# Patient Record
Sex: FEMALE | Race: WHITE | Employment: FULL TIME | URBAN - METROPOLITAN AREA
[De-identification: names, ages, dates, MRNs, and addresses within clinical notes are randomized per-mention and may not be internally consistent; named-entity substitution may affect disease eponyms.]

---

## 2017-04-28 DIAGNOSIS — I10 ESSENTIAL HYPERTENSION: ICD-10-CM

## 2017-04-28 RX ORDER — LISINOPRIL 10 MG/1
TABLET ORAL
Qty: 90 TABLET | Refills: 2 | Status: SHIPPED | OUTPATIENT
Start: 2017-04-28 | End: 2018-01-23 | Stop reason: SDUPTHER

## 2017-12-31 ENCOUNTER — HOSPITAL ENCOUNTER (EMERGENCY)
Age: 47
Discharge: HOME | End: 2017-12-31
Attending: EMERGENCY MEDICINE
Payer: COMMERCIAL

## 2017-12-31 VITALS
BODY MASS INDEX: 27.09 KG/M2 | SYSTOLIC BLOOD PRESSURE: 134 MMHG | HEART RATE: 87 BPM | RESPIRATION RATE: 15 BRPM | DIASTOLIC BLOOD PRESSURE: 79 MMHG | TEMPERATURE: 98.2 F | HEIGHT: 72 IN | OXYGEN SATURATION: 90 % | WEIGHT: 200 LBS

## 2017-12-31 DIAGNOSIS — I80.01 THROMBOPHLEBITIS OF SUPERFICIAL VEINS OF RIGHT LOWER EXTREMITY: Primary | ICD-10-CM

## 2017-12-31 LAB
ABSOLUTE EOS #: 0.1 K/UL (ref 0–0.4)
ABSOLUTE IMMATURE GRANULOCYTE: ABNORMAL K/UL (ref 0–0.3)
ABSOLUTE LYMPH #: 1.7 K/UL (ref 1–4.8)
ABSOLUTE MONO #: 0.5 K/UL (ref 0.1–1.3)
ANION GAP SERPL CALCULATED.3IONS-SCNC: 11 MMOL/L (ref 9–17)
BASOPHILS # BLD: 1 % (ref 0–2)
BASOPHILS ABSOLUTE: 0.1 K/UL (ref 0–0.2)
BUN BLDV-MCNC: 14 MG/DL (ref 6–20)
BUN/CREAT BLD: ABNORMAL (ref 9–20)
CALCIUM SERPL-MCNC: 9 MG/DL (ref 8.6–10.4)
CHLORIDE BLD-SCNC: 102 MMOL/L (ref 98–107)
CO2: 24 MMOL/L (ref 20–31)
CREAT SERPL-MCNC: 0.63 MG/DL (ref 0.5–0.9)
D-DIMER QUANTITATIVE: 0.21 MG/L FEU
DIFFERENTIAL TYPE: ABNORMAL
EOSINOPHILS RELATIVE PERCENT: 2 % (ref 0–4)
GFR AFRICAN AMERICAN: >60 ML/MIN
GFR NON-AFRICAN AMERICAN: >60 ML/MIN
GFR SERPL CREATININE-BSD FRML MDRD: ABNORMAL ML/MIN/{1.73_M2}
GFR SERPL CREATININE-BSD FRML MDRD: ABNORMAL ML/MIN/{1.73_M2}
GLUCOSE BLD-MCNC: 101 MG/DL (ref 70–99)
HCT VFR BLD CALC: 46.4 % (ref 36–46)
HEMOGLOBIN: 15.2 G/DL (ref 12–16)
IMMATURE GRANULOCYTES: ABNORMAL %
INR BLD: 1
LYMPHOCYTES # BLD: 21 % (ref 24–44)
MCH RBC QN AUTO: 28.4 PG (ref 26–34)
MCHC RBC AUTO-ENTMCNC: 32.8 G/DL (ref 31–37)
MCV RBC AUTO: 86.5 FL (ref 80–100)
MONOCYTES # BLD: 6 % (ref 1–7)
PDW BLD-RTO: 13.6 % (ref 11.5–14.9)
PLATELET # BLD: 278 K/UL (ref 150–450)
PLATELET ESTIMATE: ABNORMAL
PMV BLD AUTO: 9.7 FL (ref 6–12)
POTASSIUM SERPL-SCNC: 4.5 MMOL/L (ref 3.7–5.3)
PROTHROMBIN TIME: 10.4 SEC (ref 9.7–12)
RBC # BLD: 5.36 M/UL (ref 4–5.2)
RBC # BLD: ABNORMAL 10*6/UL
SEG NEUTROPHILS: 70 % (ref 36–66)
SEGMENTED NEUTROPHILS ABSOLUTE COUNT: 5.7 K/UL (ref 1.3–9.1)
SODIUM BLD-SCNC: 137 MMOL/L (ref 135–144)
WBC # BLD: 8 K/UL (ref 3.5–11)
WBC # BLD: ABNORMAL 10*3/UL

## 2017-12-31 PROCEDURE — 85025 COMPLETE CBC W/AUTO DIFF WBC: CPT

## 2017-12-31 PROCEDURE — 6370000000 HC RX 637 (ALT 250 FOR IP): Performed by: EMERGENCY MEDICINE

## 2017-12-31 PROCEDURE — 36415 COLL VENOUS BLD VENIPUNCTURE: CPT

## 2017-12-31 PROCEDURE — 85610 PROTHROMBIN TIME: CPT

## 2017-12-31 PROCEDURE — 99283 EMERGENCY DEPT VISIT LOW MDM: CPT

## 2017-12-31 PROCEDURE — 85379 FIBRIN DEGRADATION QUANT: CPT

## 2017-12-31 PROCEDURE — 80048 BASIC METABOLIC PNL TOTAL CA: CPT

## 2017-12-31 RX ORDER — ACETAMINOPHEN 325 MG/1
650 TABLET ORAL ONCE
Status: COMPLETED | OUTPATIENT
Start: 2017-12-31 | End: 2017-12-31

## 2017-12-31 RX ORDER — IBUPROFEN 600 MG/1
600 TABLET ORAL EVERY 8 HOURS PRN
Qty: 30 TABLET | Refills: 0 | Status: SHIPPED | OUTPATIENT
Start: 2017-12-31

## 2017-12-31 RX ADMIN — ACETAMINOPHEN 650 MG: 325 TABLET, FILM COATED ORAL at 14:52

## 2017-12-31 ASSESSMENT — PAIN DESCRIPTION - PAIN TYPE: TYPE: ACUTE PAIN

## 2017-12-31 ASSESSMENT — PAIN SCALES - GENERAL
PAINLEVEL_OUTOF10: 5
PAINLEVEL_OUTOF10: 0

## 2017-12-31 ASSESSMENT — PAIN DESCRIPTION - LOCATION: LOCATION: ARM

## 2017-12-31 NOTE — ED PROVIDER NOTES
[address]  eMERGENCY dEPARTMENT eNCOUnter      Pt Name: Atul Wagoner  MRN: [id number]  [place] 1970  Date of evaluation: 12/31/17      CHIEF COMPLAINT       Chief Complaint   Patient presents with    Arm Pain     left arm     HISTORY OF PRESENT ILLNESS   HPI 41 y.o. female, r. Handed, noted pain in her right tricep the day after guru. Pain spread down the arm. She noted that she had some bruising along the track of her brachial vein. She reports a constant throbbing pain site. Now she has pain in her forearm and bruising in her palm. She has been taking advil 600mg once per day. She is on birth control for regular periods and history of abnormal uterine bleeding. No history DVT PE area and no shortness of breath. Review of Systems   Constitutional: Negative for chills and fever. HENT: Negative for congestion. Eyes: Negative for visual disturbance. Respiratory: Negative for cough and shortness of breath. Cardiovascular: Negative for chest pain. Gastrointestinal: Negative for abdominal pain, nausea and vomiting. Endocrine: Negative for polyuria. Genitourinary: Negative for dysuria. Neurological: Negative for weakness and numbness. Hematological: Negative for adenopathy. Does not bruise/bleed easily.        PAST MEDICAL HISTORY     Past Medical History:   Diagnosis Date    DUB (dysfunctional uterine bleeding)     Hypertension     LBBB (left bundle branch block)     Ovarian cyst        SURGICAL HISTORY       Past Surgical History:   Procedure Laterality Date    ENDOMETRIAL ABLATION         CURRENT MEDICATIONS       Previous Medications    ALBUTEROL SULFATE  (90 BASE) MCG/ACT INHALER    Inhale 2 puffs into the lungs every 6 hours as needed for Wheezing    ALPRAZOLAM (XANAX) 0.5 MG TABLET    Take 1 tablet by mouth nightly as needed for Sleep    AMOXICILLIN (AMOXIL) 500 MG CAPSULE        BROMPHENIRAMINE-PSEUDOEPHEDRINE-DM 30-2-10 MG/5ML SYRUP    Take 5 mLs by mouth 3 times daily    EPINEPHRINE (EPIPEN) 0.3 MG/0.3ML SOAJ INJECTION    Inject 0.3 mg into the muscle as needed Use as directed for allergic reaction    FLUTICASONE (FLONASE) 50 MCG/ACT NASAL SPRAY    1 spray by Nasal route 2 times daily for 14 days    LISINOPRIL (PRINIVIL;ZESTRIL) 10 MG TABLET    TAKE 1 TABLET DAILY    LO LOESTRIN FE 1 MG-10 MCG / 10 MCG TABLET    Take 1 tablet by mouth daily       ALLERGIES     is allergic to bactrim [sulfamethoxazole-trimethoprim] and dilaudid [hydromorphone hcl]. FAMILY HISTORY     indicated that her mother is alive. She indicated that her father is alive. She indicated that the status of her maternal grandmother is unknown. SOCIAL HISTORY      reports that she has never smoked. She does not have any smokeless tobacco history on file. She reports that she does not drink alcohol or use drugs. PHYSICAL EXAM     INITIAL VITALS: /79   Pulse 87   Temp 98.2 °F (36.8 °C) (Oral)   Resp 15   Ht 6' 1\" (1.854 m)   Wt 200 lb (90.7 kg)   SpO2 90%   BMI 26.39 kg/m²   Gen:NAD  Head: Normocephalic, atraumatic  Eye: Pupils equal round reactive to light, no conjunctivitis  Heart: Regular rate and rhythm no murmurs  Lungs: Clear to auscultation bilaterally, no respiratory distress  Chest wall: No crepitus, no tenderness palpation  Abdomen: Soft, nontender, nondistended, with no peritoneal signs  Neurologic: Patient is alert and oriented x3, motor and sensation is intact in all 4 extremities, cerebellar function is normal  Extremities: Full range of motion, no cyanosis, there is ecchymosis and tenderness palpation along the course of the right basilic vein. There is no arm edema. MEDICAL DECISION MAKING:     MDM  Differential diagnosis is and the DVT versus a superficial thrombophlebitis versus coagulopathy. We'll check the patient's coags will get a d-dimer if the d-dimer is elevated we'll get an ultrasound of the arm.     ED Course    On reassessment, laboratory studies are unremarkable is no leukocytosis d-dimer is not elevated coags are normal.  Given the tenderness in along the course of the vein in the skin color changes I suspect that the patient has a thrombophlebitis. Discussed anti-inflammatory medications, compression, and elevation and close follow-up with PCP today sure that these changes resolved. Discussed warning precautions for return to the emergency department and stress to come back if things are not improving. LABS: All lab results were reviewed by myself, and all abnormals are listed below. Labs Reviewed   CBC WITH AUTO DIFFERENTIAL - Abnormal; Notable for the following:        Result Value    RBC 5.36 (*)     Hematocrit 46.4 (*)     Seg Neutrophils 70 (*)     Lymphocytes 21 (*)     All other components within normal limits   BASIC METABOLIC PANEL - Abnormal; Notable for the following:     Glucose 101 (*)     All other components within normal limits   D-DIMER, QUANTITATIVE   PROTIME-INR       EMERGENCY DEPARTMENT COURSE:   Vitals:    Vitals:    12/31/17 1223   BP: 134/79   Pulse: 87   Resp: 15   Temp: 98.2 °F (36.8 °C)   TempSrc: Oral   SpO2: 90%   Weight: 200 lb (90.7 kg)   Height: 6' 1\" (1.854 m)       The patient was given the following medications while in the emergency department:  Orders Placed This Encounter   Medications    acetaminophen (TYLENOL) tablet 650 mg    ibuprofen (ADVIL;MOTRIN) 600 MG tablet     Sig: Take 1 tablet by mouth every 8 hours as needed for Pain     Dispense:  30 tablet     Refill:  0   -------------------------    Procedures     FINAL IMPRESSION      1.  Thrombophlebitis of superficial veins of right lower extremity          DISPOSITION/PLAN   DISPOSITION Decision To Discharge 12/31/2017 03:17:03 PM      PATIENT REFERRED TO:  Carla Rubio NP  [address]  [address]  [address] [facility] [address] [phone]    In 1 week  To monitor until skin changes resolve    [address] [address]  [phone]    If symptoms worsen      DISCHARGE MEDICATIONS:  New Prescriptions    IBUPROFEN (ADVIL;MOTRIN) 600 MG TABLET    Take 1 tablet by mouth every 8 hours as needed for Pain         Paola Zhu MD  Attending Emergency Physician                      Paola Zhu MD  12/31/17 9701

## 2017-12-31 NOTE — ED NOTES
Pt c/o right arm pain that started in her upper right arm and migrated into her right forearm , pt noticed a streak of bruising from the upper right arm into right forearm last night .  Pt c/o pain with movement      Panchito Birmingham RN  12/31/17 [id number]

## 2020-02-28 ENCOUNTER — HOSPITAL ENCOUNTER (OUTPATIENT)
Age: 50
Discharge: HOME | End: 2020-02-28
Payer: COMMERCIAL

## 2020-02-28 LAB
ABSOLUTE EOS #: 0.2 K/UL (ref 0–0.4)
ABSOLUTE IMMATURE GRANULOCYTE: ABNORMAL K/UL (ref 0–0.3)
ABSOLUTE LYMPH #: 1.5 K/UL (ref 1–4.8)
ABSOLUTE MONO #: 0.5 K/UL (ref 0.1–1.3)
ALBUMIN SERPL-MCNC: 4.3 G/DL (ref 3.5–5.2)
ALBUMIN/GLOBULIN RATIO: NORMAL (ref 1–2.5)
ALP BLD-CCNC: 60 U/L (ref 35–104)
ALT SERPL-CCNC: 11 U/L (ref 5–33)
ANION GAP SERPL CALCULATED.3IONS-SCNC: 10 MMOL/L (ref 9–17)
AST SERPL-CCNC: 11 U/L
BASOPHILS # BLD: 1 % (ref 0–2)
BASOPHILS ABSOLUTE: 0.1 K/UL (ref 0–0.2)
BILIRUB SERPL-MCNC: 0.36 MG/DL (ref 0.3–1.2)
BUN BLDV-MCNC: 15 MG/DL (ref 6–20)
BUN/CREAT BLD: NORMAL (ref 9–20)
CALCIUM SERPL-MCNC: 8.9 MG/DL (ref 8.6–10.4)
CHLORIDE BLD-SCNC: 107 MMOL/L (ref 98–107)
CHOLESTEROL, FASTING: 192 MG/DL
CHOLESTEROL/HDL RATIO: 4.7
CO2: 23 MMOL/L (ref 20–31)
CREAT SERPL-MCNC: 0.68 MG/DL (ref 0.5–0.9)
DIFFERENTIAL TYPE: ABNORMAL
EOSINOPHILS RELATIVE PERCENT: 2 % (ref 0–4)
ESTIMATED AVERAGE GLUCOSE: 108 MG/DL
GFR AFRICAN AMERICAN: >60 ML/MIN
GFR NON-AFRICAN AMERICAN: >60 ML/MIN
GFR SERPL CREATININE-BSD FRML MDRD: NORMAL ML/MIN/{1.73_M2}
GFR SERPL CREATININE-BSD FRML MDRD: NORMAL ML/MIN/{1.73_M2}
GLUCOSE BLD-MCNC: 98 MG/DL (ref 70–99)
HBA1C MFR BLD: 5.4 % (ref 4–6)
HCT VFR BLD CALC: 45 % (ref 36–46)
HDLC SERPL-MCNC: 41 MG/DL
HEMOGLOBIN: 14.8 G/DL (ref 12–16)
IMMATURE GRANULOCYTES: ABNORMAL %
LDL CHOLESTEROL: 123 MG/DL (ref 0–130)
LYMPHOCYTES # BLD: 21 % (ref 24–44)
MCH RBC QN AUTO: 28.6 PG (ref 26–34)
MCHC RBC AUTO-ENTMCNC: 32.9 G/DL (ref 31–37)
MCV RBC AUTO: 87.2 FL (ref 80–100)
MONOCYTES # BLD: 8 % (ref 1–7)
NRBC AUTOMATED: ABNORMAL PER 100 WBC
PDW BLD-RTO: 13.4 % (ref 11.5–14.9)
PLATELET # BLD: 240 K/UL (ref 150–450)
PLATELET ESTIMATE: ABNORMAL
PMV BLD AUTO: 9.3 FL (ref 6–12)
POTASSIUM SERPL-SCNC: 4.3 MMOL/L (ref 3.7–5.3)
RBC # BLD: 5.16 M/UL (ref 4–5.2)
RBC # BLD: ABNORMAL 10*6/UL
SEG NEUTROPHILS: 68 % (ref 36–66)
SEGMENTED NEUTROPHILS ABSOLUTE COUNT: 5 K/UL (ref 1.3–9.1)
SODIUM BLD-SCNC: 140 MMOL/L (ref 135–144)
TOTAL PROTEIN: 7.3 G/DL (ref 6.4–8.3)
TRIGLYCERIDE, FASTING: 140 MG/DL
VLDLC SERPL CALC-MCNC: NORMAL MG/DL (ref 1–30)
WBC # BLD: 7.3 K/UL (ref 3.5–11)
WBC # BLD: ABNORMAL 10*3/UL

## 2020-02-28 PROCEDURE — 85025 COMPLETE CBC W/AUTO DIFF WBC: CPT

## 2020-02-28 PROCEDURE — 80061 LIPID PANEL: CPT

## 2020-02-28 PROCEDURE — 36415 COLL VENOUS BLD VENIPUNCTURE: CPT

## 2020-02-28 PROCEDURE — 83036 HEMOGLOBIN GLYCOSYLATED A1C: CPT

## 2020-02-28 PROCEDURE — 80053 COMPREHEN METABOLIC PANEL: CPT

## 2021-09-30 ENCOUNTER — NURSE TRIAGE (OUTPATIENT)
Dept: OTHER | Facility: CLINIC | Age: 51
End: 2021-09-30

## 2021-09-30 NOTE — TELEPHONE ENCOUNTER
Received call from [address]  at [facility] with Red Flag Complaint. Brief description of triage:    55 y/o with bp issue for the past  Several days   Reports vertigo and balance issues      Reports feeling better with standing up   160/90 was one of the bp readings       Pt reports inconsistent bp readings with home cuff  Also reports taking a second dose of  bp medication   Educated pt on  Taking medication as prescribed only    Triage indicates for patient to  Seen in the next  3 days     Care advice provided, patient verbalizes understanding; denies any other questions or concerns; instructed to call back for any new or worsening symptoms. Writer provided warm transfer to [employer]  at [facility] for appointment scheduling. Attention Provider: Thank you for allowing me to participate in the care of your patient. The patient was connected to triage in response to information provided to the ECC/PSC. Please do not respond through this encounter as the response is not directed to a shared pool. Reason for Disposition   Systolic BP >= 046 OR Diastolic >= 617    Answer Assessment - Initial Assessment Questions  1. BLOOD PRESSURE: \"What is the blood pressure? \" \"Did you take at least two measurements 5 minutes apart?\"      160/92 130/80    2. ONSET: \"When did you take your blood pressure? \"      Today    3. HOW: \"How did you obtain the blood pressure? \" (e.g., visiting nurse, automatic home BP monitor)      Home monitor    4. HISTORY: \"Do you have a history of high blood pressure? \"     Yes     5. MEDICATIONS: Alphia Shan you taking any medications for blood pressure? \" \"Have you missed any doses recently? \"      No     6. OTHER SYMPTOMS: \"Do you have any symptoms? \" (e.g., headache, chest pain, blurred vision, difficulty breathing, weakness)      Dizziness , occassional headache     7. PREGNANCY: \"Is there any chance you are pregnant? \" \"When was your last menstrual period? \"      n/a    Protocols used: HIGH BLOOD PRESSURE-ADULT-OH

## 2021-11-02 ENCOUNTER — HOSPITAL ENCOUNTER (OUTPATIENT)
Age: 51
Setting detail: SPECIMEN
Discharge: HOME | End: 2021-11-02
Payer: COMMERCIAL

## 2021-11-02 DIAGNOSIS — E55.9 VITAMIN D INSUFFICIENCY: ICD-10-CM

## 2021-11-02 DIAGNOSIS — I10 ESSENTIAL HYPERTENSION: ICD-10-CM

## 2021-11-02 DIAGNOSIS — R42 DIZZINESS: ICD-10-CM

## 2021-11-02 LAB
ABSOLUTE EOS #: 0.23 K/UL (ref 0–0.44)
ABSOLUTE IMMATURE GRANULOCYTE: <0.03 K/UL (ref 0–0.3)
ABSOLUTE LYMPH #: 2.03 K/UL (ref 1.1–3.7)
ABSOLUTE MONO #: 0.49 K/UL (ref 0.1–1.2)
ALBUMIN SERPL-MCNC: 4.3 G/DL (ref 3.5–5.2)
ALBUMIN/GLOBULIN RATIO: 1.6 (ref 1–2.5)
ALP BLD-CCNC: 79 U/L (ref 35–104)
ALT SERPL-CCNC: 17 U/L (ref 5–33)
ANION GAP SERPL CALCULATED.3IONS-SCNC: 12 MMOL/L (ref 9–17)
AST SERPL-CCNC: 14 U/L
BASOPHILS # BLD: 1 % (ref 0–2)
BASOPHILS ABSOLUTE: 0.08 K/UL (ref 0–0.2)
BILIRUB SERPL-MCNC: 0.37 MG/DL (ref 0.3–1.2)
BUN BLDV-MCNC: 15 MG/DL (ref 6–20)
BUN/CREAT BLD: NORMAL (ref 9–20)
CALCIUM SERPL-MCNC: 9.1 MG/DL (ref 8.6–10.4)
CHLORIDE BLD-SCNC: 106 MMOL/L (ref 98–107)
CHOLESTEROL, FASTING: 190 MG/DL
CHOLESTEROL/HDL RATIO: 4.4
CO2: 21 MMOL/L (ref 20–31)
CREAT SERPL-MCNC: 0.74 MG/DL (ref 0.5–0.9)
DIFFERENTIAL TYPE: ABNORMAL
EOSINOPHILS RELATIVE PERCENT: 3 % (ref 1–4)
FOLATE: 4.2 NG/ML
GFR AFRICAN AMERICAN: >60 ML/MIN
GFR NON-AFRICAN AMERICAN: >60 ML/MIN
GFR SERPL CREATININE-BSD FRML MDRD: NORMAL ML/MIN/{1.73_M2}
GFR SERPL CREATININE-BSD FRML MDRD: NORMAL ML/MIN/{1.73_M2}
GLUCOSE BLD-MCNC: 98 MG/DL (ref 70–99)
HCT VFR BLD CALC: 49 % (ref 36.3–47.1)
HDLC SERPL-MCNC: 43 MG/DL
HEMOGLOBIN: 14.8 G/DL (ref 11.9–15.1)
IMMATURE GRANULOCYTES: 0 %
LDL CHOLESTEROL: 122 MG/DL (ref 0–130)
LYMPHOCYTES # BLD: 28 % (ref 24–43)
MCH RBC QN AUTO: 27.7 PG (ref 25.2–33.5)
MCHC RBC AUTO-ENTMCNC: 30.2 G/DL (ref 28.4–34.8)
MCV RBC AUTO: 91.8 FL (ref 82.6–102.9)
MONOCYTES # BLD: 7 % (ref 3–12)
NRBC AUTOMATED: 0 PER 100 WBC
PDW BLD-RTO: 12.9 % (ref 11.8–14.4)
PLATELET # BLD: 266 K/UL (ref 138–453)
PLATELET ESTIMATE: ABNORMAL
PMV BLD AUTO: 11.6 FL (ref 8.1–13.5)
POTASSIUM SERPL-SCNC: 4.6 MMOL/L (ref 3.7–5.3)
RBC # BLD: 5.34 M/UL (ref 3.95–5.11)
RBC # BLD: ABNORMAL 10*6/UL
SEG NEUTROPHILS: 61 % (ref 36–65)
SEGMENTED NEUTROPHILS ABSOLUTE COUNT: 4.52 K/UL (ref 1.5–8.1)
SODIUM BLD-SCNC: 139 MMOL/L (ref 135–144)
THYROXINE, FREE: 1.25 NG/DL (ref 0.93–1.7)
TOTAL PROTEIN: 7 G/DL (ref 6.4–8.3)
TRIGLYCERIDE, FASTING: 125 MG/DL
TSH SERPL DL<=0.05 MIU/L-ACNC: 3.12 MIU/L (ref 0.3–5)
VITAMIN B-12: 287 PG/ML (ref 232–1245)
VITAMIN D 25-HYDROXY: 28.6 NG/ML (ref 30–100)
VLDLC SERPL CALC-MCNC: NORMAL MG/DL (ref 1–30)
WBC # BLD: 7.4 K/UL (ref 3.5–11.3)
WBC # BLD: ABNORMAL 10*3/UL

## 2021-11-12 ENCOUNTER — INITIAL CONSULT (OUTPATIENT)
Dept: BARIATRICS/WEIGHT MGMT | Age: 51
End: 2021-11-12
Payer: COMMERCIAL

## 2021-11-12 VITALS
BODY MASS INDEX: 33.18 KG/M2 | SYSTOLIC BLOOD PRESSURE: 115 MMHG | WEIGHT: 245 LBS | RESPIRATION RATE: 20 BRPM | DIASTOLIC BLOOD PRESSURE: 75 MMHG | HEART RATE: 81 BPM | HEIGHT: 72 IN

## 2021-11-12 DIAGNOSIS — K44.9 HIATAL HERNIA WITH GERD: Primary | ICD-10-CM

## 2021-11-12 DIAGNOSIS — K21.9 HIATAL HERNIA WITH GERD: Primary | ICD-10-CM

## 2021-11-12 PROCEDURE — G8417 CALC BMI ABV UP PARAM F/U: HCPCS | Performed by: SURGERY

## 2021-11-12 PROCEDURE — G8427 DOCREV CUR MEDS BY ELIG CLIN: HCPCS | Performed by: SURGERY

## 2021-11-12 PROCEDURE — G8484 FLU IMMUNIZE NO ADMIN: HCPCS | Performed by: SURGERY

## 2021-11-12 PROCEDURE — 99203 OFFICE O/P NEW LOW 30 MIN: CPT | Performed by: SURGERY

## 2021-11-12 PROCEDURE — 1036F TOBACCO NON-USER: CPT | Performed by: SURGERY

## 2021-11-19 NOTE — PROGRESS NOTES
[facility]  [facility] INVASIVE BARIATRIC SURG  [address] [place] [address] [place]  [address]  [facility] [address]  Dept: [phone]    ROBOTIC & MINIMALLY INVASIVE SURGERY  PROGRESS NOTE INITIAL EVALUATION     Patient: Jennifer Cobos        Service Date: 11/12/2021      HPI:     Chief Complaint   Patient presents with    Consultation     gen surg consult hiatal hernia/ reflux        The patient is a pleasant 54y.o. year old female  with GERD, who stands Height: 6' (182.9 cm) tall with a weight of Weight: 245 lb (111.1 kg) , resulting in a BMI of Body mass index is 33.23 kg/m². She reports a history of hiatal hernia and has been taking over the counter medications for her GERD. She states her reflux worsens when not on medications and also when laying flat. She denies dysphagia, melena, hematochezia or hematemesis. She denies recent endoscopy. She would like further evaluation and options for her GERD. She does not smoke or drink. The patient denies  a history of myocardial infarction, deep vein thrombosis, pulmonary embolism, renal failure, hepatic failure and stroke.       Medical History:  Past Medical History:   Diagnosis Date    DUB (dysfunctional uterine bleeding)     Hypertension     LBBB (left bundle branch block)     Ovarian cyst        Surgical History:  Past Surgical History:   Procedure Laterality Date    ENDOMETRIAL ABLATION         Family History:      Problem Relation Age of Onset    High Blood Pressure Mother     High Blood Pressure Father     Heart Defect Father         a-fib    Cancer Father         prostate    Cancer Maternal Grandmother         liver cancer       Social History:   Social History     Tobacco Use    Smoking status: Never Smoker    Smokeless tobacco: Never Used   Substance Use Topics    Alcohol use: No     Alcohol/week: 0.0 standard drinks    Drug use: No       Current Med List:  Current Outpatient Medications   Medication Sig Dispense Refill    EPINEPHrine (EPIPEN) 0.3 MG/0.3ML SOAJ injection Use as directed for allergic reaction 2 each 3    lisinopril (PRINIVIL;ZESTRIL) 10 MG tablet TAKE 1 TABLET DAILY 90 tablet 0    ibuprofen (ADVIL;MOTRIN) 600 MG tablet Take 1 tablet by mouth every 8 hours as needed for Pain 30 tablet 0    LO LOESTRIN FE 1 MG-10 MCG / 10 MCG tablet Take 1 tablet by mouth daily (Patient not taking: Reported on 10/7/2021)       No current facility-administered medications for this visit. SOCIAL:      This patient is alone for the evaluation today. [] HIV Risk Factors (i.e.) intravenous drug abuser; at risk sexual behavior; received blood products    [] TB Risk Factors (i.e.) Medically underserved, institutional care, foreign born, endemic area; exposure to active case    [] Hepatitis B&C Risk Factors (i.e.) Received blood transfusion prior to 1992; recreational drug use; high risk sexual behaviors; tattoos or body piercings; contact with blood or needle sticks in the workplace    Comprehension    Ability to grasp concepts and respond to questions:   [x] High   [] Medium   [] Low    Motivation    [x] Asks Questions; eager to learn   [] Needs education   [] Extreme anxiety    [] uncooperative   [] Denies need for education    English Speaking Ability    [x] Speaks English well   [x] Reads English well   [x] Understands spoken english    [x] Understands written English   [] No need for interpretive support      [] Might benefit from interpretive support   []  required for all services     REVIEW OF SYSTEMS: (Negative unless marked otherwise)     See review of Systems scanned into media    PRESENT ILLNESS:     Weight Parameters  Weight 245 lb (111.1 kg)   Height 6' (1.829 m)   BMI Body mass index is 33.23 kg/m².    IBW     EBW               IMMUNIZATION STATUS  Immunization History   Administered Date(s) Administered    COVID-19, Pfizer, PF, 30mcg/0.3mL 02/12/2021, 03/05/2021, 10/16/2021    Influenza Whole 01/16/2014 FALLS ASSESSMENT    [] LOW RISK FOR FALLS    [] MODERATE RISK FOR FALLS    [] Difficulty walking/selfcare    [] Falls in the past 2 months    [] Suspicion of Clinician    [] Other:      SMOKING CESSATION     [] Not needed     [] Instructed to stop smoking    [] Pamphlet community resources given     VTE SCREEN    [] Family hx DVT/PE  /   [] Personal hx of DVT/PE    [x] Denies any family or personal hx of DVT/PE    Physician Review    [x] Past medical, family, & social history reviewed and discussed with patient. Review of surgery and post-surgical changes (by surgeon for surgical patients only)    [x] Lifelong diet expectations reviewed with patient    [x] Need for lifelong vitamin supplementation reviewed with patient    PHYSICAL EXAMINATION:      /75 (Site: Right Upper Arm, Position: Sitting, Cuff Size: Large Adult)   Pulse 81   Resp 20   Ht 6' (1.829 m)   Wt 245 lb (111.1 kg)   BMI 33.23 kg/m²     Constitutional:  Vital signs are normal. The patient appears well-developed   HEENT:      Head: Normocephalic. Atraumatic     Eyes: pupils are equal and reactive. No scleral icterus is present. Neck: No mass and no thyromegaly present. Cardiovascular: Normal rate, regular rhythm, S1 normal and S2 normal.  Bilateral pulses present. Pulmonary/Chest: Effort normal and breath sounds normal. No retractions. Abdominal: Soft. Normal appearance. There is no organomegaly. No tenderness. There is no rigidity, no rebound, no guarding and no Castillo's sign. Musculoskeletal:      Right lower leg: Normal. No tenderness and no edema. Left lower leg: Normal. No tenderness and no edema. Lymphadenopathy:     No cervical adenopathy, No Exrtemity Adenopathy. Neurological: The patient is alert and oriented. Moving all four extremities equally, sensation grossly intact bilateral.  Skin: Skin is warm, dry and intact. Psychiatric: The patient has a normal mood and affect.  Speech is normal and behavior is normal. Judgment and thought content normal. Cognition and memory are normal.       PLAN:       Diagnosis Orders   1.  Hiatal hernia with GERD              PPI for GERD  No meals 3 hours prior to bedtime  Sleep with head of bed elevated  Plan for EGD with long standing reflux  We discussed medical and surgical options for GERD  Will follow up after testing  All questions answered            Electronically signed by Danyelle Mondragon DO on 11/18/2021 at 8:46 PM

## 2021-12-10 ENCOUNTER — TELEPHONE (OUTPATIENT)
Dept: BARIATRICS/WEIGHT MGMT | Age: 51
End: 2021-12-10

## 2021-12-14 ENCOUNTER — ANESTHESIA EVENT (OUTPATIENT)
Dept: OPERATING ROOM | Age: 51
End: 2021-12-14
Payer: COMMERCIAL

## 2021-12-14 ENCOUNTER — OFFICE VISIT (OUTPATIENT)
Dept: PODIATRY | Age: 51
End: 2021-12-14
Payer: COMMERCIAL

## 2021-12-14 ENCOUNTER — HOSPITAL ENCOUNTER (OUTPATIENT)
Age: 51
Setting detail: SPECIMEN
Discharge: HOME | End: 2021-12-14

## 2021-12-14 VITALS — HEIGHT: 72 IN | WEIGHT: 200 LBS | BODY MASS INDEX: 27.09 KG/M2

## 2021-12-14 DIAGNOSIS — M79.672 PAIN IN LEFT FOOT: ICD-10-CM

## 2021-12-14 DIAGNOSIS — M79.671 PAIN IN RIGHT FOOT: ICD-10-CM

## 2021-12-14 DIAGNOSIS — M72.2 PLANTAR FASCIITIS OF LEFT FOOT: ICD-10-CM

## 2021-12-14 DIAGNOSIS — M67.471 GANGLION CYST OF RIGHT FOOT: Primary | ICD-10-CM

## 2021-12-14 DIAGNOSIS — M72.2 PLANTAR FASCIITIS OF RIGHT FOOT: ICD-10-CM

## 2021-12-14 PROCEDURE — 1036F TOBACCO NON-USER: CPT | Performed by: PODIATRIST

## 2021-12-14 PROCEDURE — G8427 DOCREV CUR MEDS BY ELIG CLIN: HCPCS | Performed by: PODIATRIST

## 2021-12-14 PROCEDURE — G8484 FLU IMMUNIZE NO ADMIN: HCPCS | Performed by: PODIATRIST

## 2021-12-14 PROCEDURE — G8417 CALC BMI ABV UP PARAM F/U: HCPCS | Performed by: PODIATRIST

## 2021-12-14 PROCEDURE — 99203 OFFICE O/P NEW LOW 30 MIN: CPT | Performed by: PODIATRIST

## 2021-12-14 PROCEDURE — 20612 ASPIRATE/INJ GANGLION CYST: CPT | Performed by: PODIATRIST

## 2021-12-14 ASSESSMENT — ENCOUNTER SYMPTOMS
DIARRHEA: 0
SHORTNESS OF BREATH: 0
NAUSEA: 0
COLOR CHANGE: 0
BACK PAIN: 0

## 2021-12-15 ENCOUNTER — TELEPHONE (OUTPATIENT)
Dept: PODIATRY | Age: 51
End: 2021-12-15

## 2021-12-15 NOTE — PROGRESS NOTES
Preoperative Instructions:    Stop eating solid foods at midnight the night prior to your surgery. Stop drinking clear liquids at midnight the night prior to your surgery. Arrive at the surgery center (3rd entrance) on __[phone]_____________ by __as per surgeons office [phone]_____________. Please stop any blood thinning medications as directed by your surgeon or prescribing physician. Failure to stop certain medications may interfere with your scheduled surgery. These may include: Aspirin, Coumadin, Plavix, NSAIDS (Motrin, Aleve, Advil, Mobic, Celebrex), Eliquis, Pradaxa, Xarelto, Fish oil, and herbal supplements. You may continue the rest of your medications through the night before surgery unless instructed otherwise. Day of surgery please take only the following medication(s) with a small sip of water:None      Please shower with antibacterial soap and water. .      Reminders:  -If you are going home the day of your procedure, you will need a family member or friend to stay during the procedure and drive you home after your procedure. Your  must be 25years of age or older and able to sign off on your discharge instructions.    -If you are going home the same day of your surgery, someone must remain with you for the first 24 hours after your surgery if you receive sedation or anesthesia.      -Please do not wear any jewelery, contacts or body piercing the day of surgery

## 2021-12-16 LAB — SURGICAL PATHOLOGY REPORT: NORMAL

## 2021-12-16 RX ORDER — BUPIVACAINE HYDROCHLORIDE 5 MG/ML
1 INJECTION, SOLUTION PERINEURAL ONCE
Status: DISCONTINUED | OUTPATIENT
Start: 2021-12-16 | End: 2021-12-17 | Stop reason: HOSPADM

## 2021-12-17 ENCOUNTER — HOSPITAL ENCOUNTER (OUTPATIENT)
Age: 51
Setting detail: OUTPATIENT SURGERY
Discharge: HOME | End: 2021-12-17
Attending: SURGERY | Admitting: SURGERY
Payer: COMMERCIAL

## 2021-12-17 ENCOUNTER — ANESTHESIA (OUTPATIENT)
Dept: OPERATING ROOM | Age: 51
End: 2021-12-17
Payer: COMMERCIAL

## 2021-12-17 VITALS
HEART RATE: 65 BPM | OXYGEN SATURATION: 100 % | SYSTOLIC BLOOD PRESSURE: 113 MMHG | BODY MASS INDEX: 33.32 KG/M2 | DIASTOLIC BLOOD PRESSURE: 62 MMHG | HEIGHT: 72 IN | WEIGHT: 246 LBS | TEMPERATURE: 98.2 F | RESPIRATION RATE: 10 BRPM

## 2021-12-17 VITALS — SYSTOLIC BLOOD PRESSURE: 116 MMHG | DIASTOLIC BLOOD PRESSURE: 56 MMHG | OXYGEN SATURATION: 95 %

## 2021-12-17 LAB — HCG, PREGNANCY URINE (POC): NEGATIVE

## 2021-12-17 PROCEDURE — 2580000003 HC RX 258: Performed by: ANESTHESIOLOGY

## 2021-12-17 PROCEDURE — 7100000010 HC PHASE II RECOVERY - FIRST 15 MIN: Performed by: SURGERY

## 2021-12-17 PROCEDURE — 81025 URINE PREGNANCY TEST: CPT

## 2021-12-17 PROCEDURE — 3700000001 HC ADD 15 MINUTES (ANESTHESIA): Performed by: SURGERY

## 2021-12-17 PROCEDURE — 43239 EGD BIOPSY SINGLE/MULTIPLE: CPT | Performed by: SURGERY

## 2021-12-17 PROCEDURE — 3700000000 HC ANESTHESIA ATTENDED CARE: Performed by: SURGERY

## 2021-12-17 PROCEDURE — 6370000000 HC RX 637 (ALT 250 FOR IP)

## 2021-12-17 PROCEDURE — 7100000011 HC PHASE II RECOVERY - ADDTL 15 MIN: Performed by: SURGERY

## 2021-12-17 PROCEDURE — 88305 TISSUE EXAM BY PATHOLOGIST: CPT

## 2021-12-17 PROCEDURE — 2500000003 HC RX 250 WO HCPCS

## 2021-12-17 PROCEDURE — 2709999900 HC NON-CHARGEABLE SUPPLY: Performed by: SURGERY

## 2021-12-17 PROCEDURE — 2500000003 HC RX 250 WO HCPCS: Performed by: ANESTHESIOLOGY

## 2021-12-17 PROCEDURE — 6360000002 HC RX W HCPCS: Performed by: ANESTHESIOLOGY

## 2021-12-17 PROCEDURE — 3609012400 HC EGD TRANSORAL BIOPSY SINGLE/MULTIPLE: Performed by: SURGERY

## 2021-12-17 RX ORDER — DIPHENHYDRAMINE HYDROCHLORIDE 50 MG/ML
12.5 INJECTION INTRAMUSCULAR; INTRAVENOUS
Status: DISCONTINUED | OUTPATIENT
Start: 2021-12-17 | End: 2021-12-17 | Stop reason: HOSPADM

## 2021-12-17 RX ORDER — SODIUM CHLORIDE 0.9 % (FLUSH) 0.9 %
10 SYRINGE (ML) INJECTION PRN
Status: DISCONTINUED | OUTPATIENT
Start: 2021-12-17 | End: 2021-12-17 | Stop reason: HOSPADM

## 2021-12-17 RX ORDER — PROPOFOL 10 MG/ML
INJECTION, EMULSION INTRAVENOUS PRN
Status: DISCONTINUED | OUTPATIENT
Start: 2021-12-17 | End: 2021-12-17 | Stop reason: SDUPTHER

## 2021-12-17 RX ORDER — ONDANSETRON 2 MG/ML
4 INJECTION INTRAMUSCULAR; INTRAVENOUS
Status: DISCONTINUED | OUTPATIENT
Start: 2021-12-17 | End: 2021-12-17 | Stop reason: HOSPADM

## 2021-12-17 RX ORDER — LIDOCAINE HYDROCHLORIDE 10 MG/ML
INJECTION, SOLUTION EPIDURAL; INFILTRATION; INTRACAUDAL; PERINEURAL PRN
Status: DISCONTINUED | OUTPATIENT
Start: 2021-12-17 | End: 2021-12-17 | Stop reason: SDUPTHER

## 2021-12-17 RX ORDER — HYDRALAZINE HYDROCHLORIDE 20 MG/ML
10 INJECTION INTRAMUSCULAR; INTRAVENOUS EVERY 10 MIN PRN
Status: DISCONTINUED | OUTPATIENT
Start: 2021-12-17 | End: 2021-12-17 | Stop reason: HOSPADM

## 2021-12-17 RX ORDER — SODIUM CHLORIDE, SODIUM LACTATE, POTASSIUM CHLORIDE, CALCIUM CHLORIDE 600; 310; 30; 20 MG/100ML; MG/100ML; MG/100ML; MG/100ML
INJECTION, SOLUTION INTRAVENOUS CONTINUOUS
Status: DISCONTINUED | OUTPATIENT
Start: 2021-12-17 | End: 2021-12-17 | Stop reason: HOSPADM

## 2021-12-17 RX ORDER — 0.9 % SODIUM CHLORIDE 0.9 %
500 INTRAVENOUS SOLUTION INTRAVENOUS
Status: DISCONTINUED | OUTPATIENT
Start: 2021-12-17 | End: 2021-12-17 | Stop reason: HOSPADM

## 2021-12-17 RX ORDER — OXYCODONE HYDROCHLORIDE AND ACETAMINOPHEN 5; 325 MG/1; MG/1
1 TABLET ORAL PRN
Status: DISCONTINUED | OUTPATIENT
Start: 2021-12-17 | End: 2021-12-17 | Stop reason: HOSPADM

## 2021-12-17 RX ORDER — SCOLOPAMINE TRANSDERMAL SYSTEM 1 MG/1
PATCH, EXTENDED RELEASE TRANSDERMAL
Status: COMPLETED
Start: 2021-12-17 | End: 2021-12-17

## 2021-12-17 RX ORDER — PROMETHAZINE HYDROCHLORIDE 25 MG/ML
6.25 INJECTION, SOLUTION INTRAMUSCULAR; INTRAVENOUS
Status: DISCONTINUED | OUTPATIENT
Start: 2021-12-17 | End: 2021-12-17 | Stop reason: HOSPADM

## 2021-12-17 RX ORDER — SODIUM CHLORIDE 9 MG/ML
25 INJECTION, SOLUTION INTRAVENOUS PRN
Status: DISCONTINUED | OUTPATIENT
Start: 2021-12-17 | End: 2021-12-17 | Stop reason: HOSPADM

## 2021-12-17 RX ORDER — LABETALOL 20 MG/4 ML (5 MG/ML) INTRAVENOUS SYRINGE
10 EVERY 10 MIN PRN
Status: DISCONTINUED | OUTPATIENT
Start: 2021-12-17 | End: 2021-12-17 | Stop reason: HOSPADM

## 2021-12-17 RX ORDER — MIDAZOLAM HYDROCHLORIDE 2 MG/2ML
1 INJECTION, SOLUTION INTRAMUSCULAR; INTRAVENOUS ONCE
Status: DISCONTINUED | OUTPATIENT
Start: 2021-12-17 | End: 2021-12-17 | Stop reason: HOSPADM

## 2021-12-17 RX ORDER — SCOLOPAMINE TRANSDERMAL SYSTEM 1 MG/1
1 PATCH, EXTENDED RELEASE TRANSDERMAL ONCE
Status: CANCELLED | OUTPATIENT
Start: 2021-12-17 | End: 2021-12-17

## 2021-12-17 RX ORDER — SODIUM CHLORIDE 9 MG/ML
INJECTION, SOLUTION INTRAVENOUS CONTINUOUS
Status: DISCONTINUED | OUTPATIENT
Start: 2021-12-17 | End: 2021-12-17 | Stop reason: HOSPADM

## 2021-12-17 RX ORDER — MEPERIDINE HYDROCHLORIDE 50 MG/ML
12.5 INJECTION INTRAMUSCULAR; INTRAVENOUS; SUBCUTANEOUS EVERY 5 MIN PRN
Status: DISCONTINUED | OUTPATIENT
Start: 2021-12-17 | End: 2021-12-17 | Stop reason: HOSPADM

## 2021-12-17 RX ORDER — OXYCODONE HYDROCHLORIDE AND ACETAMINOPHEN 5; 325 MG/1; MG/1
2 TABLET ORAL PRN
Status: DISCONTINUED | OUTPATIENT
Start: 2021-12-17 | End: 2021-12-17 | Stop reason: HOSPADM

## 2021-12-17 RX ORDER — SODIUM CHLORIDE 0.9 % (FLUSH) 0.9 %
10 SYRINGE (ML) INJECTION EVERY 12 HOURS SCHEDULED
Status: DISCONTINUED | OUTPATIENT
Start: 2021-12-17 | End: 2021-12-17 | Stop reason: HOSPADM

## 2021-12-17 RX ORDER — PANTOPRAZOLE SODIUM 40 MG/1
40 TABLET, DELAYED RELEASE ORAL
Qty: 90 TABLET | Refills: 1 | Status: SHIPPED | OUTPATIENT
Start: 2021-12-17 | End: 2022-02-07 | Stop reason: SDUPTHER

## 2021-12-17 RX ORDER — MORPHINE SULFATE 2 MG/ML
2 INJECTION, SOLUTION INTRAMUSCULAR; INTRAVENOUS EVERY 5 MIN PRN
Status: DISCONTINUED | OUTPATIENT
Start: 2021-12-17 | End: 2021-12-17 | Stop reason: HOSPADM

## 2021-12-17 RX ADMIN — FAMOTIDINE 20 MG: 10 INJECTION, SOLUTION INTRAVENOUS at 08:11

## 2021-12-17 RX ADMIN — PROPOFOL 100 MG: 10 INJECTION, EMULSION INTRAVENOUS at 08:17

## 2021-12-17 RX ADMIN — LIDOCAINE HYDROCHLORIDE 50 MG: 10 INJECTION, SOLUTION EPIDURAL; INFILTRATION; INTRACAUDAL at 08:17

## 2021-12-17 RX ADMIN — PROPOFOL 50 MG: 10 INJECTION, EMULSION INTRAVENOUS at 08:27

## 2021-12-17 RX ADMIN — PROPOFOL 50 MG: 10 INJECTION, EMULSION INTRAVENOUS at 08:23

## 2021-12-17 RX ADMIN — PROPOFOL 50 MG: 10 INJECTION, EMULSION INTRAVENOUS at 08:22

## 2021-12-17 RX ADMIN — SODIUM CHLORIDE, POTASSIUM CHLORIDE, SODIUM LACTATE AND CALCIUM CHLORIDE: 600; 310; 30; 20 INJECTION, SOLUTION INTRAVENOUS at 08:10

## 2021-12-17 RX ADMIN — PROPOFOL 50 MG: 10 INJECTION, EMULSION INTRAVENOUS at 08:19

## 2021-12-17 RX ADMIN — PROPOFOL 50 MG: 10 INJECTION, EMULSION INTRAVENOUS at 08:24

## 2021-12-17 ASSESSMENT — PULMONARY FUNCTION TESTS
PIF_VALUE: 0
PIF_VALUE: 1
PIF_VALUE: 0

## 2021-12-17 ASSESSMENT — PAIN - FUNCTIONAL ASSESSMENT: PAIN_FUNCTIONAL_ASSESSMENT: 0-10

## 2021-12-17 ASSESSMENT — PAIN SCALES - GENERAL: PAINLEVEL_OUTOF10: 0

## 2021-12-17 NOTE — ANESTHESIA PRE PROCEDURE
Department of Anesthesiology  Preprocedure Note       Name:  Kenyon Samaniego   Age:  54 y.o.  :  1970                                          MRN:  [id number]         Date:  2021      Surgeon: Sterling Díaz):  Faheem Luther DO    Procedure: Procedure(s):  EGD BIOPSY    Medications prior to admission:   Prior to Admission medications    Medication Sig Start Date End Date Taking? Authorizing Provider   ALPRAZolam (XANAX PO) Take by mouth as needed    Historical Provider, MD   EPINEPHrine (EPIPEN) 0.3 MG/0.3ML SOAJ injection Use as directed for allergic reaction 10/7/21   PABLO Vásquez CNP   lisinopril (PRINIVIL;ZESTRIL) 10 MG tablet TAKE 1 TABLET DAILY 10/7/21   PABLO Vásquez CNP   ibuprofen (ADVIL;MOTRIN) 600 MG tablet Take 1 tablet by mouth every 8 hours as needed for Pain 17   Eriberto Cano MD       Current medications:    Current Facility-Administered Medications   Medication Dose Route Frequency Provider Last Rate Last Admin    0.9 % sodium chloride infusion   IntraVENous Continuous Albert Wnig MD        lactated ringers infusion   IntraVENous Continuous Albert Wing MD        sodium chloride flush 0.9 % injection 10 mL  10 mL IntraVENous 2 times per day Albert Wing MD        sodium chloride flush 0.9 % injection 10 mL  10 mL IntraVENous PRN Albert Wing MD        0.9 % sodium chloride infusion  25 mL IntraVENous PRN Albert Wing MD           Allergies:     Allergies   Allergen Reactions    Bactrim [Sulfamethoxazole-Trimethoprim] Rash    Dilaudid [Hydromorphone Hcl] Nausea And Vomiting       Problem List:    Patient Active Problem List   Diagnosis Code    Essential hypertension I10    Anxiety F41.9       Past Medical History:        Diagnosis Date    DUB (dysfunctional uterine bleeding)     Ganglion cyst of foot     GERD (gastroesophageal reflux disease)     hiatal hernia    Hypertension     LBBB (left bundle branch block)     Ovarian cyst     PONV (postoperative nausea and vomiting)        Past Surgical History:        Procedure Laterality Date    ENDOMETRIAL ABLATION         Social History:    Social History     Tobacco Use    Smoking status: Never Smoker    Smokeless tobacco: Never Used   Substance Use Topics    Alcohol use: No     Alcohol/week: 0.0 standard drinks                                Counseling given: Not Answered      Vital Signs (Current): There were no vitals filed for this visit. BP Readings from Last 3 Encounters:   11/12/21 115/75   10/07/21 124/80   02/27/20 124/76       NPO Status:                                                                                 BMI:   Wt Readings from Last 3 Encounters:   12/14/21 200 lb (90.7 kg)   11/12/21 245 lb (111.1 kg)   10/07/21 242 lb (109.8 kg)     There is no height or weight on file to calculate BMI.    CBC:   Lab Results   Component Value Date    WBC 7.4 11/02/2021    RBC 5.34 11/02/2021    RBC 3.60 12/04/2011    HGB 14.8 11/02/2021    HCT 49.0 11/02/2021    MCV 91.8 11/02/2021    RDW 12.9 11/02/2021     11/02/2021     12/04/2011       CMP:   Lab Results   Component Value Date     11/02/2021    K 4.6 11/02/2021     11/02/2021    CO2 21 11/02/2021    BUN 15 11/02/2021    CREATININE 0.74 11/02/2021    GFRAA >60 11/02/2021    LABGLOM >60 11/02/2021    GLUCOSE 98 11/02/2021    GLUCOSE 107 12/04/2011    PROT 7.0 11/02/2021    CALCIUM 9.1 11/02/2021    BILITOT 0.37 11/02/2021    ALKPHOS 79 11/02/2021    AST 14 11/02/2021    ALT 17 11/02/2021       POC Tests: No results for input(s): POCGLU, POCNA, POCK, POCCL, POCBUN, POCHEMO, POCHCT in the last 72 hours.     Coags:   Lab Results   Component Value Date    PROTIME 10.4 12/31/2017    PROTIME 12.4 12/02/2011    INR 1.0 12/31/2017    APTT 33.2 12/02/2011       HCG (If Applicable):   Lab Results   Component Value Date    PREGTESTUR NEGATIVE 04/26/2013    HCG NEGATIVE 12/02/2011 HCGQUANT <2 05/15/2013        ABGs: No results found for: PHART, PO2ART, MJJ3XGN, MWE8FPF, BEART, [id number]     Type & Screen (If Applicable):  No results found for: LABABO, LABRH    Drug/Infectious Status (If Applicable):  No results found for: HIV, HEPCAB    COVID-19 Screening (If Applicable): No results found for: COVID19        Anesthesia Evaluation  Patient summary reviewed and Nursing notes reviewed   history of anesthetic complications: PONV. Airway: Mallampati: IV  TM distance: >3 FB   Neck ROM: full  Mouth opening: > = 3 FB Dental: normal exam         Pulmonary:Negative Pulmonary ROS and normal exam                               Cardiovascular:    (+) hypertension:,                   Neuro/Psych:   Negative Neuro/Psych ROS              GI/Hepatic/Renal: Neg GI/Hepatic/Renal ROS            Endo/Other: Negative Endo/Other ROS                     ROS comment: -NPO AFTER MIDNIGHT  -ALLERGIES - BACTRIM, DILAUDID Abdominal:             Vascular: negative vascular ROS. Other Findings:             Anesthesia Plan      MAC     ASA 2       Induction: intravenous. MIPS: Postoperative opioids intended and Prophylactic antiemetics administered. Anesthetic plan and risks discussed with patient. Plan discussed with CRNA.     Attending anesthesiologist reviewed and agrees with Palak Chatterjee MD   12/17/2021

## 2021-12-17 NOTE — OP NOTE
[facility]  [facility] [place]  [address] [facility] [address]  Dept: [phone]  Loc: [phone]    12/17/2021    Preoperative Diagnosis:    GERD with hiatal hernia    Postoperative Diagnosis:   GERD with 3-4 cm hiatal hernia  Antral Gastritis  Small duodenal polyp    Procedure: Esophagogastroduodenoscopy with Biopsy    Surgeon: Atiya Qureshi DO    Assistant:    Anesthesia: MAC, see anesthesia records    Specimen:    1) Antrum for H. Pylori    2) Duodenal polyp    Findings:  3-4 cm hiatal hernia, Antral Gastritis, Duodenal polyp,     EBL: NONE    Operative Narrative: The risks and benefits were explained in detail to the patient who agreed and consented to the procedure. The patient was taken to the endoscopic suite and placed in a lateral position. Oxygen was administered via nasal cannula and a mouth guard was placed. MAC was administered via the anesthetic team.      The endoscope was then advanced into the oropharynx and down into the esophagus under direct visualization. The scope was further advanced through the esophagus, GE junction and stomach to the pylorus under visualization. The scope was passed through the pylorus and duodenal sweep performed, advancing and visualizing to the second portion of the duodenum. There was a polyp in the distal first portion on the anterior wall, this was biopsied in entirety with cold forceps. The scope was then withdrawn to the antrum and cold forceps were used to take biopsies of the antrum for H. Pylori. Appropriate hemostasis was noted. The scope was then retroflexed to visualize the GE junction. Evidence of a hiatal hernia was noted. The scope was then slowly withdrawn through the GE junction. The Z line was noted. The stomach was then decompressed. The scope was withdrawn from the esophagus and no further lesions noted. The scope was withdrawn. The patient tolerated the procedure well. PPI.     She

## 2021-12-17 NOTE — H&P
true   Transportation Needs:     Lack of Transportation (Medical): Not on file    Lack of Transportation (Non-Medical): Not on file   Physical Activity:     Days of Exercise per Week: Not on file    Minutes of Exercise per Session: Not on file   Stress:     Feeling of Stress : Not on file   Social Connections:     Frequency of Communication with Friends and Family: Not on file    Frequency of Social Gatherings with Friends and Family: Not on file    Attends Oriental orthodox Services: Not on file    Active Member of Clubs or Organizations: Not on file    Attends Club or Organization Meetings: Not on file    Marital Status: Not on file   Intimate Partner Violence:     Fear of Current or Ex-Partner: Not on file    Emotionally Abused: Not on file    Physically Abused: Not on file    Sexually Abused: Not on file   Housing Stability:     Unable to Pay for Housing in the Last Year: Not on file    Number of [place] in the Last Year: Not on file    Unstable Housing in the Last Year: Not on file       Current Facility-Administered Medications   Medication Dose Route Frequency Provider Last Rate Last Admin    0.9 % sodium chloride infusion   IntraVENous Continuous Kyle Michaels MD        lactated ringers infusion   IntraVENous Continuous Kyle Michaels MD        sodium chloride flush 0.9 % injection 10 mL  10 mL IntraVENous 2 times per day Kyle Michaels MD        sodium chloride flush 0.9 % injection 10 mL  10 mL IntraVENous PRVELIA Michaels MD        0.9 % sodium chloride infusion  25 mL IntraVENous PRVELIA Michaels MD           Objective      Physical Exam  LMP 10/15/2013    Constitutional:  Vital signs are normal. The patient appears well-developed and well-nourished. HEENT:   Head: Normocephalic. Atraumatic  Eyes: pupils are equal and reactive. No scleral icterus is present. Neck: No mass and no thyromegaly present.    Cardiovascular: Normal rate, regular rhythm, S1 normal and S2 normal.    Pulmonary/Chest: Effort normal and breath sounds normal.   Abdominal: Soft. Normal appearance. There is no organomegaly. No tenderness. There is no rigidity, no rebound, no guarding and no Castillo's sign. Musculoskeletal:        Right lower leg: Normal. No tenderness and no edema. Left lower leg: Normal. No tenderness and no edema. Lymphadenopathy:     No cervical adenopathy, No Exrtemity Adenopathy. Neurological: The patient is alert and oriented. Skin: Skin is warm, dry and intact. Psychiatric: The patient has a normal mood and affect.  Speech is normal and behavior is normal. Judgment and thought content normal. Cognition and memory are normal.     Assessment     Patient Active Problem List   Diagnosis    Essential hypertension    Anxiety     Intractable GERD despite medications  Epigastric pain    Plan   EGD

## 2021-12-17 NOTE — ANESTHESIA POSTPROCEDURE EVALUATION
Department of Anesthesiology  Postprocedure Note    Patient: Negar Diaz  MRN: [id number]  YOB: 1970  Date of evaluation: 12/17/2021  Time:  8:36 AM     Procedure Summary     Date: 12/17/21 Room / Location: [facility] 4777 / [facility]    Anesthesia Start: 1732 Anesthesia Stop: 7341    Procedure: EGD BIOPSY (N/A ) Diagnosis:       (K21.9 GERD )      (E66.9 OBESITY)    Surgeons: Ramon Maguire DO Responsible Provider: Chapito Mcnally MD    Anesthesia Type: MAC ASA Status: 2          Anesthesia Type: MAC    Kitty Phase I: Kitty Score: 10    Kitty Phase II: Kitty Score: 9    Last vitals: Reviewed and per EMR flowsheets.        Anesthesia Post Evaluation    Patient location during evaluation: PACU  Patient participation: complete - patient participated  Level of consciousness: awake and alert  Airway patency: patent  Nausea & Vomiting: no nausea and no vomiting  Complications: no  Cardiovascular status: hemodynamically stable  Respiratory status: nasal cannula and spontaneous ventilation  Hydration status: euvolemic  Multimodal analgesia pain management approach

## 2021-12-21 ENCOUNTER — NURSE ONLY (OUTPATIENT)
Dept: PODIATRY | Age: 51
End: 2021-12-21

## 2021-12-21 LAB — SURGICAL PATHOLOGY REPORT: NORMAL

## 2021-12-21 PROCEDURE — L3020 FOOT LONGITUD/METATARSAL SUP: HCPCS | Performed by: PODIATRIST

## 2021-12-21 NOTE — PROGRESS NOTES
CLINICAL PHARMACY NOTE: MEDS TO BEDS    Total # of Prescriptions Filled: 1   The following medications were delivered to the patient:  · Pantoprazole 40mg    Additional Documentation:

## 2022-01-11 ENCOUNTER — OFFICE VISIT (OUTPATIENT)
Dept: PODIATRY | Age: 52
End: 2022-01-11
Payer: COMMERCIAL

## 2022-01-11 VITALS — HEIGHT: 72 IN | BODY MASS INDEX: 32.46 KG/M2

## 2022-01-11 DIAGNOSIS — M67.471 GANGLION CYST OF RIGHT FOOT: Primary | ICD-10-CM

## 2022-01-11 DIAGNOSIS — M79.671 PAIN IN RIGHT FOOT: ICD-10-CM

## 2022-01-11 PROCEDURE — G8417 CALC BMI ABV UP PARAM F/U: HCPCS | Performed by: PODIATRIST

## 2022-01-11 PROCEDURE — 99213 OFFICE O/P EST LOW 20 MIN: CPT | Performed by: PODIATRIST

## 2022-01-11 PROCEDURE — G8427 DOCREV CUR MEDS BY ELIG CLIN: HCPCS | Performed by: PODIATRIST

## 2022-01-11 PROCEDURE — G8484 FLU IMMUNIZE NO ADMIN: HCPCS | Performed by: PODIATRIST

## 2022-01-11 PROCEDURE — 1036F TOBACCO NON-USER: CPT | Performed by: PODIATRIST

## 2022-01-11 ASSESSMENT — ENCOUNTER SYMPTOMS
DIARRHEA: 0
SHORTNESS OF BREATH: 0
COLOR CHANGE: 0
NAUSEA: 0
BACK PAIN: 0

## 2022-01-11 NOTE — PROGRESS NOTES
[address] [place] [address] Podiatry  Return Patient Progress Note    Subjective: Adri Johnson 54 y.o. female that presents for follow up evaluation of cyst to the right foot. Chief Complaint   Patient presents with    Follow-up     right foot    Patient's treatment thus far has included drainage of the cyst.  Pain is rated 0 out of 10 and is described as none. Patient has been following my prescribed course of therapy as instructed. Review of Systems   Constitutional: Negative for activity change, appetite change, chills, diaphoresis, fatigue and fever. Respiratory: Negative for shortness of breath. Cardiovascular: Negative for leg swelling. Gastrointestinal: Negative for diarrhea and nausea. Endocrine: Negative for cold intolerance, heat intolerance and polyuria. Musculoskeletal: Negative for arthralgias, back pain, gait problem, joint swelling and myalgias. Skin: Negative for color change, pallor, rash and wound. Allergic/Immunologic: Negative for environmental allergies and food allergies. Neurological: Negative for dizziness, weakness, light-headedness and numbness. Hematological: Does not bruise/bleed easily. Psychiatric/Behavioral: Negative for behavioral problems, confusion and self-injury. The patient is not nervous/anxious. Objective: Clinical evaluation of the patient reveals a very small subcutaneous nodule to the dorsal aspect of the right foot overlying the first metatarsal cuneiform joint. Palpation to this area shows mostly bone spur. There is no pain with palpation to this area of the right foot. There is no erythema, calor, or open wound noted to the right foot. A review of the pathology report shows the cystic fluid to be consistent with a ganglion. Assessment:    Diagnosis Orders   1. Ganglion cyst of right foot     2. Pain in right foot           Plan: 1. Clinical evaluation of the patient.  2. Patient to closely monitor the right foot for any enlarging of the cyst or any increase in pain. Patient will be contacted when her orthotics are in. 3. Return if symptoms worsen or fail to improve.    1/11/2022      Chandni Garcia DPM

## 2022-01-24 ENCOUNTER — TELEPHONE (OUTPATIENT)
Dept: PODIATRY | Age: 52
End: 2022-01-24

## 2022-01-25 ENCOUNTER — NURSE ONLY (OUTPATIENT)
Dept: PODIATRY | Age: 52
End: 2022-01-25

## 2023-04-18 ENCOUNTER — HOSPITAL ENCOUNTER (OUTPATIENT)
Age: 53
Setting detail: SPECIMEN
Discharge: HOME | End: 2023-04-18

## 2023-04-18 DIAGNOSIS — E55.9 VITAMIN D INSUFFICIENCY: ICD-10-CM

## 2023-04-18 DIAGNOSIS — I10 ESSENTIAL HYPERTENSION: ICD-10-CM

## 2023-04-18 LAB
25(OH)D3 SERPL-MCNC: 24.9 NG/ML
ALBUMIN SERPL-MCNC: 4 G/DL (ref 3.5–5.2)
ALBUMIN/GLOBULIN RATIO: 1.5 (ref 1–2.5)
ALP SERPL-CCNC: 64 U/L (ref 35–104)
ALT SERPL-CCNC: 13 U/L (ref 5–33)
ANION GAP SERPL CALCULATED.3IONS-SCNC: 10 MMOL/L (ref 9–17)
AST SERPL-CCNC: 12 U/L
BILIRUB SERPL-MCNC: 0.3 MG/DL (ref 0.3–1.2)
BUN SERPL-MCNC: 9 MG/DL (ref 6–20)
CALCIUM SERPL-MCNC: 9.2 MG/DL (ref 8.6–10.4)
CHLORIDE SERPL-SCNC: 104 MMOL/L (ref 98–107)
CHOLEST SERPL-MCNC: 157 MG/DL
CHOLESTEROL/HDL RATIO: 3.8
CO2 SERPL-SCNC: 25 MMOL/L (ref 20–31)
CREAT SERPL-MCNC: 0.75 MG/DL (ref 0.5–0.9)
GFR SERPL CREATININE-BSD FRML MDRD: >60 ML/MIN/1.73M2
GLUCOSE SERPL-MCNC: 81 MG/DL (ref 70–99)
HCT VFR BLD AUTO: 44.9 % (ref 36.3–47.1)
HDLC SERPL-MCNC: 41 MG/DL
HGB BLD-MCNC: 13.7 G/DL (ref 11.9–15.1)
LDLC SERPL CALC-MCNC: 92 MG/DL (ref 0–130)
MCH RBC QN AUTO: 28.4 PG (ref 25.2–33.5)
MCHC RBC AUTO-ENTMCNC: 30.5 G/DL (ref 28.4–34.8)
MCV RBC AUTO: 93 FL (ref 82.6–102.9)
NRBC AUTOMATED: 0 PER 100 WBC
PDW BLD-RTO: 13.3 % (ref 11.8–14.4)
PLATELET # BLD AUTO: 246 K/UL (ref 138–453)
PMV BLD AUTO: 11.5 FL (ref 8.1–13.5)
POTASSIUM SERPL-SCNC: 4.4 MMOL/L (ref 3.7–5.3)
PROT SERPL-MCNC: 6.7 G/DL (ref 6.4–8.3)
RBC # BLD: 4.83 M/UL (ref 3.95–5.11)
SODIUM SERPL-SCNC: 139 MMOL/L (ref 135–144)
TRIGL SERPL-MCNC: 118 MG/DL
WBC # BLD AUTO: 6.9 K/UL (ref 3.5–11.3)

## 2024-03-13 ENCOUNTER — HOSPITAL ENCOUNTER (OUTPATIENT)
Age: 54
Setting detail: SPECIMEN
Discharge: HOME | End: 2024-03-13

## 2024-03-13 DIAGNOSIS — Z91.89 LACK OF MOTIVATION: ICD-10-CM

## 2024-03-13 DIAGNOSIS — Z13.220 SCREENING FOR HYPERLIPIDEMIA: ICD-10-CM

## 2024-03-13 DIAGNOSIS — R53.83 OTHER FATIGUE: ICD-10-CM

## 2024-03-13 DIAGNOSIS — Z13.1 SCREENING FOR DIABETES MELLITUS: ICD-10-CM

## 2024-03-13 DIAGNOSIS — E55.9 VITAMIN D INSUFFICIENCY: ICD-10-CM

## 2024-03-13 LAB
25(OH)D3 SERPL-MCNC: 18.6 NG/ML
ALBUMIN SERPL-MCNC: 4.3 G/DL (ref 3.5–5.2)
ALBUMIN/GLOB SERPL: 1.5 {RATIO} (ref 1–2.5)
ALP SERPL-CCNC: 66 U/L (ref 35–104)
ALT SERPL-CCNC: 15 U/L (ref 5–33)
ANION GAP SERPL CALCULATED.3IONS-SCNC: 12 MMOL/L (ref 9–17)
AST SERPL-CCNC: 11 U/L
BASOPHILS # BLD: 0.07 K/UL (ref 0–0.2)
BASOPHILS NFR BLD: 1 % (ref 0–2)
BILIRUB SERPL-MCNC: 0.3 MG/DL (ref 0.3–1.2)
BUN SERPL-MCNC: 18 MG/DL (ref 6–20)
CALCIUM SERPL-MCNC: 9.1 MG/DL (ref 8.6–10.4)
CHLORIDE SERPL-SCNC: 107 MMOL/L (ref 98–107)
CHOLEST SERPL-MCNC: 184 MG/DL
CHOLESTEROL/HDL RATIO: 4.6
CO2 SERPL-SCNC: 23 MMOL/L (ref 20–31)
CREAT SERPL-MCNC: 0.7 MG/DL (ref 0.5–0.9)
EOSINOPHIL # BLD: 0.17 K/UL (ref 0–0.44)
EOSINOPHILS RELATIVE PERCENT: 3 % (ref 1–4)
ERYTHROCYTE [DISTWIDTH] IN BLOOD BY AUTOMATED COUNT: 12.9 % (ref 11.8–14.4)
EST. AVERAGE GLUCOSE BLD GHB EST-MCNC: 105 MG/DL
FOLATE SERPL-MCNC: 4.6 NG/ML (ref 4.8–24.2)
GFR SERPL CREATININE-BSD FRML MDRD: >60 ML/MIN/1.73M2
GLUCOSE SERPL-MCNC: 87 MG/DL (ref 70–99)
HBA1C MFR BLD: 5.3 % (ref 4–6)
HCT VFR BLD AUTO: 45.2 % (ref 36.3–47.1)
HDLC SERPL-MCNC: 40 MG/DL
HGB BLD-MCNC: 14.3 G/DL (ref 11.9–15.1)
IMM GRANULOCYTES # BLD AUTO: 0.04 K/UL (ref 0–0.3)
IMM GRANULOCYTES NFR BLD: 1 %
LDLC SERPL CALC-MCNC: 114 MG/DL (ref 0–130)
LYMPHOCYTES NFR BLD: 1.98 K/UL (ref 1.1–3.7)
LYMPHOCYTES RELATIVE PERCENT: 30 % (ref 24–43)
MCH RBC QN AUTO: 28.1 PG (ref 25.2–33.5)
MCHC RBC AUTO-ENTMCNC: 31.6 G/DL (ref 28.4–34.8)
MCV RBC AUTO: 89 FL (ref 82.6–102.9)
MONOCYTES NFR BLD: 0.48 K/UL (ref 0.1–1.2)
MONOCYTES NFR BLD: 7 % (ref 3–12)
NEUTROPHILS NFR BLD: 58 % (ref 36–65)
NEUTS SEG NFR BLD: 3.89 K/UL (ref 1.5–8.1)
NRBC BLD-RTO: 0 PER 100 WBC
PLATELET # BLD AUTO: 238 K/UL (ref 138–453)
PMV BLD AUTO: 11.7 FL (ref 8.1–13.5)
POTASSIUM SERPL-SCNC: 4.5 MMOL/L (ref 3.7–5.3)
PROT SERPL-MCNC: 7.1 G/DL (ref 6.4–8.3)
RBC # BLD AUTO: 5.08 M/UL (ref 3.95–5.11)
SODIUM SERPL-SCNC: 142 MMOL/L (ref 135–144)
TRIGL SERPL-MCNC: 152 MG/DL
TSH SERPL DL<=0.05 MIU/L-ACNC: 2.14 UIU/ML (ref 0.3–5)
VIT B12 SERPL-MCNC: 309 PG/ML (ref 232–1245)
WBC OTHER # BLD: 6.6 K/UL (ref 3.5–11.3)

## 2024-06-07 ENCOUNTER — OFFICE VISIT (OUTPATIENT)
Age: 54
End: 2024-06-07

## 2024-06-07 DIAGNOSIS — N39.498 OTHER URINARY INCONTINENCE: Primary | ICD-10-CM

## 2024-06-07 DIAGNOSIS — N81.84 PELVIC MUSCLE WASTING: ICD-10-CM

## 2024-06-07 DIAGNOSIS — R27.8 ABNORMAL COORDINATION: ICD-10-CM

## 2024-06-07 NOTE — PATIENT INSTRUCTIONS
The Knack     Lift and squeeze the muscles in and around all three pelvic openings (urethra, vagina, and anus) immediately before you cough, sneeze or lift  Contract around all three pelvic openings at once, with a strong inward lift and squeeze of your pelvic floor muscles  Maintain this pelvic floor muscle contraction as you do a small cough  After you cough, relax your pelvic floor muscles back to normal resting level  Progress this exercise with more forceful cough, or repeating a couple of coughs in a row maintaining your pelvic floor contraction throughout as you do so      ‘The Knack’ can be used with events or activities that increase downward pressure upon your pelvic floor such as:  Coughing  Sneezing  Lifting  Blowing your nose  Rising into standing from sitting  Stepping down heavily.

## 2024-06-14 ENCOUNTER — EVALUATION (OUTPATIENT)
Age: 54
End: 2024-06-14
Payer: COMMERCIAL

## 2024-06-14 DIAGNOSIS — N81.84 PELVIC MUSCLE WASTING: ICD-10-CM

## 2024-06-14 DIAGNOSIS — N39.498 OTHER URINARY INCONTINENCE: Primary | ICD-10-CM

## 2024-06-14 DIAGNOSIS — R27.8 ABNORMAL COORDINATION: ICD-10-CM

## 2024-06-14 PROCEDURE — 97530 THERAPEUTIC ACTIVITIES: CPT

## 2024-06-14 PROCEDURE — 97112 NEUROMUSCULAR REEDUCATION: CPT

## 2024-06-14 NOTE — PROGRESS NOTES
[facility] UROGYNECOLOGY AND PELVIC REHABILITATION   [address] [place] [address]  Dept: [phone]     Physical Therapy Daily Treatment Note    Date:  2024  Patient Name:  Shital Aaron    :  1970  MRN: [id number]  Physician:      PABLO Dumont- FLY               Insurance: [employer]   Medical Diagnosis/Rehab Codes:        Encounter Diagnoses   Name Primary?    Other urinary incontinence Yes    Pelvic muscle wasting      Abnormal coordination           Onset Date: many years ago                                    Next 's appt: going to schedule with urogynecology      Visit# / total visits:     Cancels/No Shows: 0    Subjective:    Pain:  [] Yes  [] No Location:  N/A Pain Rating: (0-10 scale)   Pain altered Tx:  [] No  [] Yes  Action:    Today, Shital reports symptoms are the same and had some leakage as normal this past week. Would love not to wear the pad. .     Progress made: consistency with home work   Persistent symptoms: weakness and leakage  New symptoms: none    She does have her new patient appt scheduled for Dr. Van regarding her fibroids.    Shital reports good following last appointment, and endorses good compliance with home exercise program. Shiatl no adverse response to prescribed home exercises/activities.     Plan for today's session was verbally explained to patient along with clinical rationale. Shital  verbalized agreement with today's plan prior to the initiation of treatment.    Objective:  Today’s Treatment:  Modalities:   Exercises:  Exercise Reps/ Time Weight/ Level Comments   Education  18 min      strengthening principles    The conrad 5 min      review via sEMG   Bladder retraining  3 min edu       review                  sEMG training 40 min     Quick cont  Endurance with 5 s hold    *all coordinated with breath    Avg work was 7.5 uV to 8 uV  Avg rest

## 2024-06-14 NOTE — PATIENT INSTRUCTIONS
General Pelvic Floor Strength Exercises   60 reps/max 100 per day    **Perform all exercises in a position where your back is supported (resting position)    Quick Contraction Training  Perform quick contractions of pelvic floor muscles. Make sure you relax between each contraction and isolate the contraction (2 seconds) to only your pelvic floor. Think quick. As you EXHALE quickly squeeze pelvic floor (2 seconds) and then relax for 2 seconds. Try to perform at least 2 during 1 exhale.  Try to maintain the squeeze at about 50% effort for the entire exhale.     1 exhale = 1 rep    Perform 5 reps x 3 times per day       Strength/Endurance Training  Perform sustained contractions of pelvic floor muscles with coordinated sustained exhalation. Make sure you relax between each contraction and isolate the contraction to your pelvic floor with EXHALE (try to hold for 5 seconds). Try to maintain the squeeze (think 50% effort) for the entire exhale. Count to 5 out loud. NO BUTTOCKS MUSCLES AKA GLUTES.     1 exhale= 1 rep    Perform 5 reps x 3 times per day.         TIPS:     Do not hold your breath, count out loud of you notice you are doing this.     If you are having trouble isolating your pelvic floor, try squeezing and relaxing your buttocks muscles and then try and squeezing pelvic floor muscles (should feel more subtle and specific)

## 2024-06-21 ENCOUNTER — EVALUATION (OUTPATIENT)
Age: 54
End: 2024-06-21

## 2024-06-21 DIAGNOSIS — N81.84 PELVIC MUSCLE WASTING: ICD-10-CM

## 2024-06-21 DIAGNOSIS — R27.8 ABNORMAL COORDINATION: ICD-10-CM

## 2024-06-21 DIAGNOSIS — N39.498 OTHER URINARY INCONTINENCE: Primary | ICD-10-CM

## 2024-06-21 NOTE — PROGRESS NOTES
[facility] UROGYNECOLOGY AND PELVIC REHABILITATION   [address] [place] [address]  Dept: [phone]     Physical Therapy Daily Treatment Note    Date:  2024  Patient Name:  Shital Aaron    :  1970  MRN: [id number]  Physician:      PABLO Dumont- FLY               Insurance: [employer] [place]   Medical Diagnosis/Rehab Codes:        Encounter Diagnoses   Name Primary?    Other urinary incontinence Yes    Pelvic muscle wasting      Abnormal coordination           Onset Date: many years ago                                    Next 's appt: going to schedule with urogynecology      Visit# / total visits: 3/12    Cancels/No Shows: 0    Subjective:    Pain:  [] Yes  [] No Location:  N/A Pain Rating: (0-10 scale)   Pain altered Tx:  [] No  [] Yes  Action:    Today, Shital reports that it seems like her muscles feel stronger.  Continues with leakage and feels like about the same.         Progress made: consistency with home work   Persistent symptoms: weakness and leakage  New symptoms: none    She does have her new patient appt scheduled for Dr. Van regarding her fibroids (mid July)    Shital reports feeling good and might feel her PFM squeezes more following last appointment, and endorses good compliance with home exercise program. Shital no adverse response to prescribed home exercises/activities.     Plan for today's session was verbally explained to patient along with clinical rationale. Shital  verbalized agreement with today's plan prior to the initiation of treatment.    Objective:  Today’s Treatment:  Modalities:   Exercises:  Exercise Reps/ Time Weight/ Level Comments   Education  8 min      strengthening principles  Functional pelvic brace  Working on exhaling with effort, adding 1 activity per day         Pelvic brace series  15 min   In hook-lying  1) proper breath work   2) breath with

## 2024-06-21 NOTE — PATIENT INSTRUCTIONS
General Pelvic Brace Exercise:     Lie or rest in a supported position. As you BREATHE OUT (exhale), gently SQUEEZE pelvic floor and abdomen (like you are buttoning a tight pair of pants) so that it lifts up (no glutes/buttock squeezes) and hold during entire breath out (exhale). INHALE (breath in) and relax ALL tissue completely. LET IT MELT!    Practice this series 5 to 10 times each for at least 2-5x per day.       Exhale with effort. Pick an activity per day to try this

## 2024-06-25 ENCOUNTER — EVALUATION (OUTPATIENT)
Age: 54
End: 2024-06-25
Payer: COMMERCIAL

## 2024-06-25 DIAGNOSIS — R27.8 ABNORMAL COORDINATION: ICD-10-CM

## 2024-06-25 DIAGNOSIS — N81.84 PELVIC MUSCLE WASTING: ICD-10-CM

## 2024-06-25 DIAGNOSIS — N39.498 OTHER URINARY INCONTINENCE: Primary | ICD-10-CM

## 2024-06-25 PROCEDURE — 97112 NEUROMUSCULAR REEDUCATION: CPT

## 2024-06-25 PROCEDURE — 97530 THERAPEUTIC ACTIVITIES: CPT

## 2024-06-25 NOTE — PROGRESS NOTES
[facility], [facility] UROGYNECOLOGY AND PELVIC REHABILITATION   [address] [place] [address]  [address] [place] [address]  Dept: [phone]     Date of Visit: 2024   Patient: Shital Aaron   : 1970   Referring Physician: CECILIA Dumont   Insurance: [employer] [place]     Visit#:  4 of 6  Visit Diagnoses:   Diagnosis Orders   1. Other urinary incontinence        2. Pelvic muscle wasting        3. Abnormal coordination              Subjective:  Shital Aaron  (: 1970  is a 53 y.o.  y.o. female ,Established patient.  Pt states she does have an appt with Nasrin at dr donohue office on 7/15 would like to get a surgical consult as well from Dr Allen.  Pt states leakage is slightly better.  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    Objective:     BFB  in sitting an with anterior pelvic tilt to recruit and initiate anterior PFM 7/10/7=11.3/8.6 fair contraction with slight VC for proper recruitment, fair endurance and fair breathing    BFB  in standing with an with anterior pelvic tilt to recruit and initiate anterior PFM 7/10/7=10.0/8.8 fair contraction with slight VC for proper recruitment, fair endurance and fair breathing      Treatment:    There-Act:  23 MIN  Reviewed POC and HEP.  Proper recruitment for kegels in sitting and standing SLAN educated to decrease caffeine added bridging with proper bracing and breathing    Neuromuscular Víctor:  30 MIN  Bio-feed back sitting, standing    LTG ( to be met in 12 treatments):   1. Pt to improve muscle strength and recruitment of PFM on a daily basis using her home exercise program to increase continence. Partially met    2. Pt will decrease intake of bladder irritants (diet soda/coffee) to no more than 3 cups per day to decrease urinary frequency/urgency. progressing    3.

## 2024-06-25 NOTE — TELEPHONE ENCOUNTER
Referral:  Colon cancer screening   I do not see that the patient is established.  May be scheduled with any provider.  Called patient and reviewed colon screening questionnaire and okay to schedule.  Reviewed bowel prep instructions with patient over phone and sent via [email or web address].  Writer thanked and call ended.    Procedure scheduled/Dr MARION Mabry  Procedure: colon  Dx: colon cancer screening   Date: 12/11/24   Time: 11:45 A.M.  Hospital: [facility]   Bowel Prep instructions given: miralax dulc  In office/via phone: phone  Clearance needed: no

## 2024-07-02 RX ORDER — BISACODYL 5 MG/1
TABLET, DELAYED RELEASE ORAL
Qty: 4 TABLET | Refills: 0 | Status: SHIPPED | OUTPATIENT
Start: 2024-07-02

## 2024-07-02 RX ORDER — POLYETHYLENE GLYCOL 3350 17 G/17G
POWDER, FOR SOLUTION ORAL
Qty: 238 G | Refills: 0 | Status: SHIPPED | OUTPATIENT
Start: 2024-07-02

## 2024-07-14 NOTE — PROGRESS NOTES
health topics on top of those listed in the chief complaint, and Additional testing and or procedures    Point of care: Medical decision making and point of care discussed with the team & supervising physician to qualify as a shared visit when applicable, supervising physician by proxy provider    Follow up: Return if symptoms worsen or fail to improve.     Electronically signed by PABLO Odom CNP on 7/15/2024 at 9:50 AM    EMR / Voice Dictation Disclaimer: - This note is created with the assistance of a speech recognition program.  While intending to generate a timely document that accurately reflects the content of the visit, there is no guarantee every grammatical, syntax, or spelling error has been or will be identified or corrected.  Additionally, system limitations of the EMR and voice recognition software beyond the control of the practitioner may cause unintentional errors or omissions not identified or corrected at the time of record finalization and signature.

## 2024-07-15 ENCOUNTER — OFFICE VISIT (OUTPATIENT)
Age: 54
End: 2024-07-15

## 2024-07-15 ENCOUNTER — HOSPITAL ENCOUNTER (OUTPATIENT)
Age: 54
Setting detail: SPECIMEN
Discharge: HOME | End: 2024-07-15

## 2024-07-15 VITALS
HEART RATE: 71 BPM | BODY MASS INDEX: 33.59 KG/M2 | DIASTOLIC BLOOD PRESSURE: 60 MMHG | HEIGHT: 72 IN | SYSTOLIC BLOOD PRESSURE: 104 MMHG | OXYGEN SATURATION: 98 % | WEIGHT: 248 LBS | TEMPERATURE: 97.7 F

## 2024-07-15 DIAGNOSIS — N39.3 SUI (STRESS URINARY INCONTINENCE, FEMALE): Primary | ICD-10-CM

## 2024-07-15 DIAGNOSIS — D21.9 LEIOMYOMA: ICD-10-CM

## 2024-07-15 DIAGNOSIS — R33.9 RETENTION OF URINE: ICD-10-CM

## 2024-07-15 DIAGNOSIS — N76.0 ACUTE VAGINITIS: ICD-10-CM

## 2024-07-15 LAB
BILIRUBIN, POC: ABNORMAL
BLOOD URINE, POC: ABNORMAL
CANDIDA SPECIES: POSITIVE
CLARITY, POC: CLEAR
COLOR, POC: YELLOW
GARDNERELLA VAGINALIS: POSITIVE
GLUCOSE URINE, POC: ABNORMAL
KETONES, POC: ABNORMAL
LEUKOCYTE EST, POC: 500
NITRITE, POC: ABNORMAL
PH, POC: 5
PROTEIN, POC: ABNORMAL
SOURCE: ABNORMAL
SPECIFIC GRAVITY, POC: 1.02
TRICHOMONAS: NEGATIVE
UROBILINOGEN, POC: ABNORMAL

## 2024-07-15 RX ORDER — ALPRAZOLAM 0.5 MG/1
0.5 TABLET ORAL NIGHTLY PRN
COMMUNITY

## 2024-07-16 RX ORDER — FLUCONAZOLE 150 MG/1
150 TABLET ORAL
Qty: 2 TABLET | Refills: 0 | Status: SHIPPED | OUTPATIENT
Start: 2024-07-16 | End: 2024-07-22

## 2024-07-16 RX ORDER — METRONIDAZOLE 7.5 MG/G
1 GEL VAGINAL DAILY
Qty: 70 G | Refills: 0 | Status: SHIPPED | OUTPATIENT
Start: 2024-07-16 | End: 2024-07-21

## 2024-07-17 ENCOUNTER — EVALUATION (OUTPATIENT)
Age: 54
End: 2024-07-17
Payer: COMMERCIAL

## 2024-07-17 DIAGNOSIS — N39.498 OTHER URINARY INCONTINENCE: Primary | ICD-10-CM

## 2024-07-17 DIAGNOSIS — N81.84 PELVIC MUSCLE WASTING: ICD-10-CM

## 2024-07-17 DIAGNOSIS — R27.8 ABNORMAL COORDINATION: ICD-10-CM

## 2024-07-17 PROCEDURE — 97530 THERAPEUTIC ACTIVITIES: CPT

## 2024-07-17 NOTE — PATIENT INSTRUCTIONS
General Pelvic Brace Exercise:     Lie or rest in a supported position. As you BREATHE OUT (exhale), gently SQUEEZE pelvic floor and abdomen (like you are buttoning a tight pair of pants) so that it lifts up (no glutes/buttock squeezes) and hold during entire breath out (exhale). INHALE (breath in) and relax ALL tissue completely. LET IT MELT!    Practice this series 5 to 10 times each for at least 2-5x per day.     The Knack     Lift and squeeze the muscles in and around all three pelvic openings (urethra, vagina, and anus) immediately before you cough, sneeze or lift  Contract around all three pelvic openings at once, with a strong inward lift and squeeze of your pelvic floor muscles  Maintain this pelvic floor muscle contraction as you do a small cough  After you cough, relax your pelvic floor muscles back to normal resting level  Progress this exercise with more forceful cough, or repeating a couple of coughs in a row maintaining your pelvic floor contraction throughout as you do so      ‘The Knack’ can be used with events or activities that increase downward pressure upon your pelvic floor such as:  Coughing  Sneezing  Lifting  Blowing your nose  Rising into standing from sitting  Stepping down heavily.    General Pelvic Floor Strength Exercises   60 reps/max 100 per day    **Perform all exercises in a position where your back is supported (resting position)    Quick Contraction Training  Perform quick contractions of pelvic floor muscles. Make sure you relax between each contraction and isolate the contraction (2 seconds) to only your pelvic floor. Think quick. As you EXHALE quickly squeeze pelvic floor (2 seconds) and then relax for 2 seconds. Try to perform at least 2 during 1 exhale.  Try to maintain the squeeze at about 50% effort for the entire exhale.     1 exhale = 1 rep    Perform 5 reps x 3 times per day       Strength/Endurance Training  Perform sustained contractions of pelvic floor muscles with

## 2024-07-17 NOTE — PROGRESS NOTES
[facility] UROGYNECOLOGY AND PELVIC REHABILITATION   [address] [place] [address]  Dept: [phone]     Physical Therapy Daily Treatment Note/Discharge summary     Date:  2024  Patient Name:  Shital Aaron    :  1970  MRN: [id number]  Physician:      PABLO Dumont- FLY               Insurance: Medical [place]   Medical Diagnosis/Rehab Codes:           Encounter Diagnoses   Name Primary?    Other urinary incontinence Yes    Pelvic muscle wasting      Abnormal coordination           Onset Date: many years ago                                    Next 's appt: going to schedule with urogynecology      Visit# / total visits:                      Cancels/No Shows: 0    Subjective:    Pain:  [] Yes  [] No Location:  N/A    Today, Shital reports feeling improved pelvic floor strength but minimal symptom change.   She plans to see Dr. Allen in later August. She may be recommended hysterectomy and sling. She plans to get an ultrasound done in earlier August.    She has noticed a difference. She feels like she has gotten stronger.     In the last few weeks, she has had slight leakage and will get a functional leakage at times.       Progress made: some    Shital reports good following last appointment, and endorses excellent compliance with home exercise program. Shital no adverse response to prescribed home exercises/activities.     Plan for today's session was verbally explained to patient along with clinical rationale. Shital  verbalized agreement with today's plan prior to the initiation of treatment.    Objective:  Today’s Treatment:  Modalities:   Exercises:    Pelvic Floor  No Deficit  Deficit  Not Tested  Comments    External                Scarring   X       Introitus   X  Erythema at opening   Perineal Descent   X  mod   Skin Condition   X     Excursion   X     External Clock  X      Sensation

## 2024-08-02 ENCOUNTER — PATIENT MESSAGE (OUTPATIENT)
Age: 54
End: 2024-08-02

## 2024-08-02 DIAGNOSIS — D25.0 INTRAMURAL, SUBMUCOUS, AND SUBSEROUS LEIOMYOMA OF UTERUS: Primary | ICD-10-CM

## 2024-08-02 DIAGNOSIS — D25.1 INTRAMURAL, SUBMUCOUS, AND SUBSEROUS LEIOMYOMA OF UTERUS: Primary | ICD-10-CM

## 2024-08-02 DIAGNOSIS — N92.0 MENORRHAGIA WITH REGULAR CYCLE: ICD-10-CM

## 2024-08-02 DIAGNOSIS — D25.2 INTRAMURAL, SUBMUCOUS, AND SUBSEROUS LEIOMYOMA OF UTERUS: Primary | ICD-10-CM

## 2024-08-02 PROBLEM — D25.9 FIBROID UTERUS: Status: ACTIVE | Noted: 2024-08-02

## 2024-08-02 NOTE — TELEPHONE ENCOUNTER
Called patient to discuss US and Dr. Allen recommendations. She VU, and to further discuss at future testing.

## 2024-08-02 NOTE — PROGRESS NOTES
[facility], [facility] UROGYNECOLOGY AND PELVIC REHABILITATION   [address] [place] [address]  Dept: [phone]  Date: 8/20/2024  Patient Name: Shital Aaron    VISIT - FOLLOW UP VISIT     CC: had concerns including Cystoscopy.    Chaperone present: Resident    HPI: Patient has a 14 cm fibroid uterus without menorrhagia and relative amenorrhea. She has pelvic pain and pressure. Fibroids have not shrink with menopause.  She has had a prior ablation so resampling would be difficult.  She is done with childbearing.  This may be a factor related to her urinary incontinence and pelvic pressure. PFT helped mildly. She is interested in more definitive management.  She has had 2 vaginal births.    Overall state of well-being, dietary and nutritional habits, exercise routines of at least 30 minutes 3 times a week, bowel and bladder function, smoking history, HRT history, sexual activity and partner/s, dyspareunia, and immunizations all revisited if necessary by chart review or direct questioning.    Topics of Prolapse, Pain, Pressure were revisited including type, period of onset, level of severity, quality and quantity, associations, trends, exacerbators, alleviators, bleeding, prolapse to reduce, splinting, and prior treatment / surgery.    Topics of Urinary Leakage were revisited including type, period of onset, level of severity, quality and quantity, associations, trends, exacerbators, alleviators, urgency, frequency, nocturia ,urge incontinence / stress incontinence triggers, hesitancy, obstruction with need to catheterize, frequent UTI\"s >3 in a year diagnosed by culture, hematuria (gross or microscopic), urinary stones, and prior treatment / surgery.    Topics of Fecal Incontinence were revisited including type, period of onset, level of severity, quality and quantity, associations, trends, exacerbators, alleviators, times per day/week,

## 2024-08-20 ENCOUNTER — PROCEDURE VISIT (OUTPATIENT)
Age: 54
End: 2024-08-20

## 2024-08-20 VITALS — DIASTOLIC BLOOD PRESSURE: 71 MMHG | HEART RATE: 82 BPM | SYSTOLIC BLOOD PRESSURE: 130 MMHG

## 2024-08-20 DIAGNOSIS — N76.0 ACUTE VAGINITIS: ICD-10-CM

## 2024-08-20 DIAGNOSIS — N92.0 MENORRHAGIA WITH REGULAR CYCLE: ICD-10-CM

## 2024-08-20 DIAGNOSIS — N39.498 OTHER URINARY INCONTINENCE: ICD-10-CM

## 2024-08-20 DIAGNOSIS — D25.0 INTRAMURAL, SUBMUCOUS, AND SUBSEROUS LEIOMYOMA OF UTERUS: Primary | ICD-10-CM

## 2024-08-20 DIAGNOSIS — Z41.9 ELECTIVE SURGERY: ICD-10-CM

## 2024-08-20 DIAGNOSIS — D25.1 INTRAMURAL, SUBMUCOUS, AND SUBSEROUS LEIOMYOMA OF UTERUS: Primary | ICD-10-CM

## 2024-08-20 DIAGNOSIS — N39.3 SUI (STRESS URINARY INCONTINENCE, FEMALE): ICD-10-CM

## 2024-08-20 DIAGNOSIS — D21.9 LEIOMYOMA: ICD-10-CM

## 2024-08-20 DIAGNOSIS — R27.8 ABNORMAL COORDINATION: ICD-10-CM

## 2024-08-20 DIAGNOSIS — N81.84 PELVIC MUSCLE WASTING: ICD-10-CM

## 2024-08-20 DIAGNOSIS — D25.2 INTRAMURAL, SUBMUCOUS, AND SUBSEROUS LEIOMYOMA OF UTERUS: Primary | ICD-10-CM

## 2024-08-20 DIAGNOSIS — R33.9 RETENTION OF URINE: ICD-10-CM

## 2024-08-20 LAB
BILIRUBIN, POC: NORMAL
BLOOD URINE, POC: NORMAL
CLARITY, POC: CLEAR
COLOR, POC: YELLOW
GLUCOSE URINE, POC: NORMAL
KETONES, POC: NORMAL
LEUKOCYTE EST, POC: NORMAL
NITRITE, POC: NORMAL
PH, POC: 5
PROTEIN, POC: NORMAL
SPECIFIC GRAVITY, POC: 1.02
UROBILINOGEN, POC: NORMAL

## 2024-08-20 NOTE — PATIENT INSTRUCTIONS
Conference.  ROBOTICS: Laparoscopic procedures may be assisted with the use of the robot. Evidence based medicine shows the robot has no increased risk of injury, blood loss, or hospital stay to patients over abdominal, traditional laparoscopic, or vaginal routes of surgery.   HYSTERECTOMY (REMOVAL OF UTERUS): The patient understands hysterectomy is permanent and not reversible. Pregnancy or the ability to bear children will be impossible after this procedure. She understands she has the right to refuse a hysterectomy recommendation, and she also has a right to seek a consultation from a second physician. Hysterectomy with oophorectomy may raise concerns and the need for a discussion regarding estrogen replacement therapy. Per the surgical standard at [facility], Dr. Allen's training institution, cervical removal with hysterectomy is preferred. There is no evidenced based study to indicate cervical removal causes anorgasmia or decreased sexual satisfaction. A specific discussion will be entertained for those patients wishing to retain their cervix. The risk of post-hysterectomy cervical cancer is 1/56,000. Cervical prolapse may occur in 15% of patients retaining their cervix after hysterectomy.    POSTOPERATIVE CONSIDERATIONS  BLADDER CARE: Catheter care may be required for a few days to weeks. Depending on the intensity of surgery, up to 70% of women cannot urinate efficiently after surgery due to swelling or anesthesia. This retention may last a few hours to a few weeks. Prolonged urinary retention may occur after an incontinence surgery and is more likely if retention predates surgery or with factors included but not limited to neuropathy, diabetes, or prior pelvic surgery. These patients may need a catheter to drain the bladder for 1-2 weeks on average. Patients in Dr. Allen's practice prefer a transurethral john and the FLOTUS trial at home voiding trial recommendations will be given to the patient as

## 2024-11-20 DIAGNOSIS — Z01.818 PRE-OP TESTING: Primary | ICD-10-CM

## 2024-12-02 ENCOUNTER — HOSPITAL ENCOUNTER (OUTPATIENT)
Age: 54
Discharge: HOME | End: 2024-12-06
Payer: COMMERCIAL

## 2024-12-02 VITALS
OXYGEN SATURATION: 95 % | SYSTOLIC BLOOD PRESSURE: 131 MMHG | TEMPERATURE: 98.1 F | WEIGHT: 256 LBS | BODY MASS INDEX: 34.67 KG/M2 | RESPIRATION RATE: 16 BRPM | HEIGHT: 72 IN | DIASTOLIC BLOOD PRESSURE: 79 MMHG | HEART RATE: 85 BPM

## 2024-12-02 DIAGNOSIS — Z01.818 PRE-OP TESTING: ICD-10-CM

## 2024-12-02 LAB
ALBUMIN SERPL-MCNC: 4.6 G/DL (ref 3.5–5.2)
ANION GAP SERPL CALCULATED.3IONS-SCNC: 10 MMOL/L (ref 9–16)
BUN SERPL-MCNC: 16 MG/DL (ref 6–20)
CALCIUM SERPL-MCNC: 9.4 MG/DL (ref 8.6–10.4)
CHLORIDE SERPL-SCNC: 104 MMOL/L (ref 98–107)
CO2 SERPL-SCNC: 27 MMOL/L (ref 20–31)
CREAT SERPL-MCNC: 0.8 MG/DL (ref 0.6–0.9)
EKG ATRIAL RATE: 81 BPM
EKG P AXIS: 49 DEGREES
EKG P-R INTERVAL: 168 MS
EKG Q-T INTERVAL: 418 MS
EKG QRS DURATION: 158 MS
EKG QTC CALCULATION (BAZETT): 485 MS
EKG R AXIS: 12 DEGREES
EKG T AXIS: 100 DEGREES
EKG VENTRICULAR RATE: 81 BPM
ERYTHROCYTE [DISTWIDTH] IN BLOOD BY AUTOMATED COUNT: 12.7 % (ref 11.8–14.4)
GFR, ESTIMATED: 89 ML/MIN/1.73M2
GLUCOSE SERPL-MCNC: 98 MG/DL (ref 74–99)
HCT VFR BLD AUTO: 46.8 % (ref 36.3–47.1)
HGB BLD-MCNC: 14.6 G/DL (ref 11.9–15.1)
MCH RBC QN AUTO: 28 PG (ref 25.2–33.5)
MCHC RBC AUTO-ENTMCNC: 31.2 G/DL (ref 28.4–34.8)
MCV RBC AUTO: 89.8 FL (ref 82.6–102.9)
NRBC BLD-RTO: 0 PER 100 WBC
PLATELET # BLD AUTO: 260 K/UL (ref 138–453)
PMV BLD AUTO: 11.7 FL (ref 8.1–13.5)
POTASSIUM SERPL-SCNC: 4.8 MMOL/L (ref 3.7–5.3)
RBC # BLD AUTO: 5.21 M/UL (ref 3.95–5.11)
SODIUM SERPL-SCNC: 141 MMOL/L (ref 136–145)
WBC OTHER # BLD: 6.4 K/UL (ref 3.5–11.3)

## 2024-12-02 PROCEDURE — 80048 BASIC METABOLIC PNL TOTAL CA: CPT

## 2024-12-02 PROCEDURE — 86901 BLOOD TYPING SEROLOGIC RH(D): CPT

## 2024-12-02 PROCEDURE — 82040 ASSAY OF SERUM ALBUMIN: CPT

## 2024-12-02 PROCEDURE — 86900 BLOOD TYPING SEROLOGIC ABO: CPT

## 2024-12-02 PROCEDURE — 93010 ELECTROCARDIOGRAM REPORT: CPT | Performed by: INTERNAL MEDICINE

## 2024-12-02 PROCEDURE — 93005 ELECTROCARDIOGRAM TRACING: CPT

## 2024-12-02 PROCEDURE — 86850 RBC ANTIBODY SCREEN: CPT

## 2024-12-02 PROCEDURE — 85027 COMPLETE CBC AUTOMATED: CPT

## 2024-12-02 PROCEDURE — 36415 COLL VENOUS BLD VENIPUNCTURE: CPT

## 2024-12-02 NOTE — DISCHARGE INSTRUCTIONS
Preoperative Instructions:    Stop eating solid foods at midnight the night prior to your surgery.     Stop drinking clear liquids at midnight the night prior to your surgery.    Arrive at the surgery center C entrance) on __[id number]_____________ by __0530-0600am_____________.     Please stop any blood thinning medications as directed by your surgeon or prescribing physician. Failure to stop certain medications may interfere with your scheduled surgery. These may include: Aspirin, Coumadin, Plavix, NSAIDS (Motrin, Aleve, Advil, Mobic, Celebrex), Eliquis, Pradaxa, Xarelto, Fish oil, and herbal supplements.     You may continue the rest of your medications through the night before surgery unless instructed otherwise.     Day of surgery please take only the following medication(s) with a small sip of water:Pantoprazole.      Please  shower  with provided CHG soap the day before and the morning of the day of surgery.      Reminders:  -If you are going home the day of your procedure, you will need a family member or friend to stay during the procedure and drive you home after your procedure. Your  must be 18 years of age or older and able to sign off on your discharge instructions.    -If you are going home the same day of your surgery, someone must remain with you for the first 24 hours after your surgery if you receive sedation or anesthesia.     -Please do not wear any jewelery , lotions,or body piercing the day of surgery

## 2024-12-03 ENCOUNTER — ANESTHESIA EVENT (OUTPATIENT)
Dept: OPERATING ROOM | Age: 54
End: 2024-12-03
Payer: COMMERCIAL

## 2024-12-03 LAB
ABO + RH BLD: NORMAL
ARM BAND NUMBER: NORMAL
BLOOD BANK SAMPLE EXPIRATION: NORMAL
BLOOD GROUP ANTIBODIES SERPL: NEGATIVE

## 2024-12-05 ENCOUNTER — HOSPITAL ENCOUNTER (OUTPATIENT)
Age: 54
Discharge: HOME | End: 2024-12-07
Payer: COMMERCIAL

## 2024-12-05 DIAGNOSIS — I44.7 LEFT BUNDLE BRANCH BLOCK (LBBB) DETERMINED BY ELECTROCARDIOGRAPHY: ICD-10-CM

## 2024-12-05 LAB
ECHO AO ASC DIAM: 3 CM
ECHO AO ROOT DIAM: 2.6 CM
ECHO AV AREA PEAK VELOCITY: 1.6 CM2
ECHO AV AREA VTI: 1.7 CM2
ECHO AV MEAN GRADIENT: 6 MMHG
ECHO AV MEAN VELOCITY: 1.1 M/S
ECHO AV PEAK GRADIENT: 11 MMHG
ECHO AV PEAK VELOCITY: 1.7 M/S
ECHO AV VELOCITY RATIO: 0.53
ECHO AV VTI: 35.2 CM
ECHO EST RA PRESSURE: 3 MMHG
ECHO LA AREA 2C: 21.4 CM2
ECHO LA AREA 4C: 15.2 CM2
ECHO LA DIAMETER: 4.2 CM
ECHO LA MAJOR AXIS: 5.2 CM
ECHO LA MINOR AXIS: 5.6 CM
ECHO LA TO AORTIC ROOT RATIO: 1.62
ECHO LA VOL BP: 51 ML (ref 22–52)
ECHO LA VOL MOD A2C: 68 ML (ref 22–52)
ECHO LA VOL MOD A4C: 36 ML (ref 22–52)
ECHO LV EF PHYSICIAN: 40 %
ECHO LV FRACTIONAL SHORTENING: 35 % (ref 28–44)
ECHO LV GLOBAL LONGITUDINAL STRAIN (GLS): -18.3 %
ECHO LV INTERNAL DIMENSION DIASTOLIC: 5.7 CM (ref 3.9–5.3)
ECHO LV INTERNAL DIMENSION SYSTOLIC: 3.7 CM
ECHO LV IVSD: 1.1 CM (ref 0.6–0.9)
ECHO LV MASS 2D: 256.7 G (ref 67–162)
ECHO LV POSTERIOR WALL DIASTOLIC: 1.1 CM (ref 0.6–0.9)
ECHO LV RELATIVE WALL THICKNESS RATIO: 0.39
ECHO LVOT AREA: 3.1 CM2
ECHO LVOT AV VTI INDEX: 0.53
ECHO LVOT DIAM: 2 CM
ECHO LVOT MEAN GRADIENT: 2 MMHG
ECHO LVOT PEAK GRADIENT: 3 MMHG
ECHO LVOT PEAK VELOCITY: 0.9 M/S
ECHO LVOT SV: 58.1 ML
ECHO LVOT VTI: 18.5 CM
ECHO MV A VELOCITY: 0.91 M/S
ECHO MV AREA VTI: 2.3 CM2
ECHO MV E DECELERATION TIME (DT): 353 MS
ECHO MV E VELOCITY: 0.85 M/S
ECHO MV E/A RATIO: 0.93
ECHO MV LVOT VTI INDEX: 1.38
ECHO MV MAX VELOCITY: 1 M/S
ECHO MV MEAN GRADIENT: 1 MMHG
ECHO MV MEAN VELOCITY: 0.5 M/S
ECHO MV PEAK GRADIENT: 4 MMHG
ECHO MV VTI: 25.6 CM
ECHO RA AREA 4C: 14.4 CM2
ECHO RA VOLUME: 34 ML
ECHO RIGHT VENTRICULAR SYSTOLIC PRESSURE (RVSP): 9 MMHG
ECHO RV BASAL DIMENSION: 3.3 CM
ECHO RV TAPSE: 2.1 CM (ref 1.7–?)
ECHO TV REGURGITANT MAX VELOCITY: 1.26 M/S
ECHO TV REGURGITANT PEAK GRADIENT: 6 MMHG

## 2024-12-05 PROCEDURE — C8929 TTE W OR WO FOL WCON,DOPPLER: HCPCS

## 2024-12-05 PROCEDURE — 6360000004 HC RX CONTRAST MEDICATION: Performed by: NURSE PRACTITIONER

## 2024-12-05 RX ADMIN — PERFLUTREN 1.5 ML: 6.52 INJECTION, SUSPENSION INTRAVENOUS at 08:48

## 2024-12-15 PROBLEM — Z98.890 POSTOPERATIVE STATE: Status: ACTIVE | Noted: 2024-12-15

## 2024-12-16 ENCOUNTER — HOSPITAL ENCOUNTER (OUTPATIENT)
Age: 54
Setting detail: OBSERVATION
Discharge: HOME | End: 2024-12-17
Attending: OBSTETRICS & GYNECOLOGY | Admitting: OBSTETRICS & GYNECOLOGY
Payer: COMMERCIAL

## 2024-12-16 ENCOUNTER — ANESTHESIA (OUTPATIENT)
Dept: OPERATING ROOM | Age: 54
End: 2024-12-16
Payer: COMMERCIAL

## 2024-12-16 DIAGNOSIS — D25.9 UTERINE LEIOMYOMA, UNSPECIFIED LOCATION: ICD-10-CM

## 2024-12-16 DIAGNOSIS — Z98.890 POSTOPERATIVE STATE: Primary | ICD-10-CM

## 2024-12-16 DIAGNOSIS — N39.3 URINARY, INCONTINENCE, STRESS FEMALE: ICD-10-CM

## 2024-12-16 LAB
HCT VFR BLD AUTO: 42 % (ref 36–46)
HGB BLD-MCNC: 13.8 G/DL (ref 12–16)

## 2024-12-16 PROCEDURE — C1771 REP DEV, URINARY, W/SLING: HCPCS | Performed by: OBSTETRICS & GYNECOLOGY

## 2024-12-16 PROCEDURE — 3700000001 HC ADD 15 MINUTES (ANESTHESIA): Performed by: OBSTETRICS & GYNECOLOGY

## 2024-12-16 PROCEDURE — 2500000003 HC RX 250 WO HCPCS: Performed by: SPECIALIST

## 2024-12-16 PROCEDURE — 6360000002 HC RX W HCPCS: Performed by: STUDENT IN AN ORGANIZED HEALTH CARE EDUCATION/TRAINING PROGRAM

## 2024-12-16 PROCEDURE — 6370000000 HC RX 637 (ALT 250 FOR IP): Performed by: STUDENT IN AN ORGANIZED HEALTH CARE EDUCATION/TRAINING PROGRAM

## 2024-12-16 PROCEDURE — 3600000009 HC SURGERY ROBOT BASE: Performed by: OBSTETRICS & GYNECOLOGY

## 2024-12-16 PROCEDURE — 2500000003 HC RX 250 WO HCPCS: Performed by: OBSTETRICS & GYNECOLOGY

## 2024-12-16 PROCEDURE — 6360000002 HC RX W HCPCS: Performed by: OBSTETRICS & GYNECOLOGY

## 2024-12-16 PROCEDURE — 64488 TAP BLOCK BI INJECTION: CPT | Performed by: ANESTHESIOLOGY

## 2024-12-16 PROCEDURE — 2580000003 HC RX 258: Performed by: STUDENT IN AN ORGANIZED HEALTH CARE EDUCATION/TRAINING PROGRAM

## 2024-12-16 PROCEDURE — 85018 HEMOGLOBIN: CPT

## 2024-12-16 PROCEDURE — S2900 ROBOTIC SURGICAL SYSTEM: HCPCS | Performed by: OBSTETRICS & GYNECOLOGY

## 2024-12-16 PROCEDURE — 3600000019 HC SURGERY ROBOT ADDTL 15MIN: Performed by: OBSTETRICS & GYNECOLOGY

## 2024-12-16 PROCEDURE — 6360000002 HC RX W HCPCS: Performed by: ANESTHESIOLOGY

## 2024-12-16 PROCEDURE — 6360000002 HC RX W HCPCS

## 2024-12-16 PROCEDURE — 2580000003 HC RX 258: Performed by: SPECIALIST

## 2024-12-16 PROCEDURE — 36415 COLL VENOUS BLD VENIPUNCTURE: CPT

## 2024-12-16 PROCEDURE — 6360000002 HC RX W HCPCS: Performed by: NURSE ANESTHETIST, CERTIFIED REGISTERED

## 2024-12-16 PROCEDURE — 7100000000 HC PACU RECOVERY - FIRST 15 MIN: Performed by: OBSTETRICS & GYNECOLOGY

## 2024-12-16 PROCEDURE — 2500000003 HC RX 250 WO HCPCS: Performed by: NURSE ANESTHETIST, CERTIFIED REGISTERED

## 2024-12-16 PROCEDURE — 2709999900 HC NON-CHARGEABLE SUPPLY: Performed by: OBSTETRICS & GYNECOLOGY

## 2024-12-16 PROCEDURE — 2720000010 HC SURG SUPPLY STERILE: Performed by: OBSTETRICS & GYNECOLOGY

## 2024-12-16 PROCEDURE — 85014 HEMATOCRIT: CPT

## 2024-12-16 PROCEDURE — 2580000003 HC RX 258: Performed by: ANESTHESIOLOGY

## 2024-12-16 PROCEDURE — G0378 HOSPITAL OBSERVATION PER HR: HCPCS

## 2024-12-16 PROCEDURE — 7100000001 HC PACU RECOVERY - ADDTL 15 MIN: Performed by: OBSTETRICS & GYNECOLOGY

## 2024-12-16 PROCEDURE — 2500000003 HC RX 250 WO HCPCS

## 2024-12-16 PROCEDURE — 3700000000 HC ANESTHESIA ATTENDED CARE: Performed by: OBSTETRICS & GYNECOLOGY

## 2024-12-16 PROCEDURE — 6370000000 HC RX 637 (ALT 250 FOR IP): Performed by: ANESTHESIOLOGY

## 2024-12-16 PROCEDURE — 96372 THER/PROPH/DIAG INJ SC/IM: CPT

## 2024-12-16 PROCEDURE — 96375 TX/PRO/DX INJ NEW DRUG ADDON: CPT

## 2024-12-16 PROCEDURE — 6360000002 HC RX W HCPCS: Performed by: SPECIALIST

## 2024-12-16 PROCEDURE — 88307 TISSUE EXAM BY PATHOLOGIST: CPT

## 2024-12-16 DEVICE — SUPRAPUBIC MID-URETHRAL SLING
Type: IMPLANTABLE DEVICE | Site: PELVIS | Status: FUNCTIONAL
Brand: LYNX ULTRA SYSTEM

## 2024-12-16 RX ORDER — PANTOPRAZOLE SODIUM 40 MG/1
40 TABLET, DELAYED RELEASE ORAL
Status: DISCONTINUED | OUTPATIENT
Start: 2024-12-17 | End: 2024-12-17 | Stop reason: HOSPADM

## 2024-12-16 RX ORDER — BUPIVACAINE HYDROCHLORIDE AND EPINEPHRINE 5; 5 MG/ML; UG/ML
INJECTION, SOLUTION EPIDURAL; INTRACAUDAL; PERINEURAL PRN
Status: DISCONTINUED | OUTPATIENT
Start: 2024-12-16 | End: 2024-12-16 | Stop reason: ALTCHOICE

## 2024-12-16 RX ORDER — ONDANSETRON 2 MG/ML
4 INJECTION INTRAMUSCULAR; INTRAVENOUS
Status: DISCONTINUED | OUTPATIENT
Start: 2024-12-16 | End: 2024-12-16 | Stop reason: HOSPADM

## 2024-12-16 RX ORDER — PROCHLORPERAZINE EDISYLATE 5 MG/ML
10 INJECTION INTRAMUSCULAR; INTRAVENOUS EVERY 6 HOURS PRN
Status: DISCONTINUED | OUTPATIENT
Start: 2024-12-16 | End: 2024-12-16 | Stop reason: SDUPTHER

## 2024-12-16 RX ORDER — GLYCOPYRROLATE 0.2 MG/ML
0.4 INJECTION INTRAMUSCULAR; INTRAVENOUS ONCE
Status: DISCONTINUED | OUTPATIENT
Start: 2024-12-16 | End: 2024-12-16 | Stop reason: HOSPADM

## 2024-12-16 RX ORDER — OXYCODONE HYDROCHLORIDE 5 MG/1
5 TABLET ORAL EVERY 4 HOURS PRN
Status: DISCONTINUED | OUTPATIENT
Start: 2024-12-16 | End: 2024-12-17 | Stop reason: HOSPADM

## 2024-12-16 RX ORDER — SODIUM CHLORIDE 0.9 % (FLUSH) 0.9 %
5-40 SYRINGE (ML) INJECTION EVERY 12 HOURS SCHEDULED
Status: DISCONTINUED | OUTPATIENT
Start: 2024-12-16 | End: 2024-12-17 | Stop reason: HOSPADM

## 2024-12-16 RX ORDER — DIPHENHYDRAMINE HYDROCHLORIDE 50 MG/ML
12.5 INJECTION INTRAMUSCULAR; INTRAVENOUS
Status: DISCONTINUED | OUTPATIENT
Start: 2024-12-16 | End: 2024-12-16 | Stop reason: HOSPADM

## 2024-12-16 RX ORDER — BUPIVACAINE HYDROCHLORIDE AND EPINEPHRINE 5; 5 MG/ML; UG/ML
INJECTION, SOLUTION PERINEURAL
Status: DISCONTINUED
Start: 2024-12-16 | End: 2024-12-16

## 2024-12-16 RX ORDER — MORPHINE SULFATE 2 MG/ML
2 INJECTION, SOLUTION INTRAMUSCULAR; INTRAVENOUS EVERY 5 MIN PRN
Status: DISCONTINUED | OUTPATIENT
Start: 2024-12-16 | End: 2024-12-16 | Stop reason: HOSPADM

## 2024-12-16 RX ORDER — CEPHALEXIN 500 MG/1
500 CAPSULE ORAL 2 TIMES DAILY
Qty: 14 CAPSULE | Refills: 0 | Status: SHIPPED | OUTPATIENT
Start: 2024-12-16 | End: 2024-12-23

## 2024-12-16 RX ORDER — OXYCODONE AND ACETAMINOPHEN 5; 325 MG/1; MG/1
1 TABLET ORAL EVERY 6 HOURS PRN
Qty: 20 TABLET | Refills: 0 | Status: SHIPPED | OUTPATIENT
Start: 2024-12-16 | End: 2024-12-23

## 2024-12-16 RX ORDER — SIMETHICONE 80 MG
80 TABLET,CHEWABLE ORAL EVERY 6 HOURS PRN
Status: DISCONTINUED | OUTPATIENT
Start: 2024-12-16 | End: 2024-12-16

## 2024-12-16 RX ORDER — SEVOFLURANE 250 ML/250ML
LIQUID RESPIRATORY (INHALATION)
Status: DISCONTINUED
Start: 2024-12-16 | End: 2024-12-16

## 2024-12-16 RX ORDER — IBUPROFEN 600 MG/1
600 TABLET, FILM COATED ORAL EVERY 6 HOURS
Qty: 30 TABLET | Refills: 1 | Status: SHIPPED | OUTPATIENT
Start: 2024-12-16

## 2024-12-16 RX ORDER — MORPHINE SULFATE 2 MG/ML
2 INJECTION, SOLUTION INTRAMUSCULAR; INTRAVENOUS
Status: DISCONTINUED | OUTPATIENT
Start: 2024-12-16 | End: 2024-12-17

## 2024-12-16 RX ORDER — FUROSEMIDE 10 MG/ML
INJECTION INTRAMUSCULAR; INTRAVENOUS
Status: DISCONTINUED | OUTPATIENT
Start: 2024-12-16 | End: 2024-12-16 | Stop reason: SDUPTHER

## 2024-12-16 RX ORDER — EPHEDRINE SULFATE/0.9% NACL/PF 25 MG/5 ML
SYRINGE (ML) INTRAVENOUS
Status: DISCONTINUED | OUTPATIENT
Start: 2024-12-16 | End: 2024-12-16 | Stop reason: SDUPTHER

## 2024-12-16 RX ORDER — FENTANYL CITRATE 50 UG/ML
100 INJECTION, SOLUTION INTRAMUSCULAR; INTRAVENOUS ONCE
Status: CANCELLED | OUTPATIENT
Start: 2024-12-16 | End: 2024-12-16

## 2024-12-16 RX ORDER — SIMETHICONE 80 MG
80 TABLET,CHEWABLE ORAL EVERY 6 HOURS
Status: DISCONTINUED | OUTPATIENT
Start: 2024-12-16 | End: 2024-12-17 | Stop reason: HOSPADM

## 2024-12-16 RX ORDER — OXYCODONE HYDROCHLORIDE 5 MG/1
10 TABLET ORAL EVERY 4 HOURS PRN
Status: DISCONTINUED | OUTPATIENT
Start: 2024-12-16 | End: 2024-12-17 | Stop reason: HOSPADM

## 2024-12-16 RX ORDER — SODIUM CHLORIDE 9 MG/ML
INJECTION, SOLUTION INTRAVENOUS PRN
Status: DISCONTINUED | OUTPATIENT
Start: 2024-12-16 | End: 2024-12-16 | Stop reason: HOSPADM

## 2024-12-16 RX ORDER — SODIUM CHLORIDE 0.9 % (FLUSH) 0.9 %
5-40 SYRINGE (ML) INJECTION PRN
Status: DISCONTINUED | OUTPATIENT
Start: 2024-12-16 | End: 2024-12-16 | Stop reason: HOSPADM

## 2024-12-16 RX ORDER — MIDAZOLAM HYDROCHLORIDE 2 MG/2ML
2 INJECTION, SOLUTION INTRAMUSCULAR; INTRAVENOUS
Status: DISCONTINUED | OUTPATIENT
Start: 2024-12-16 | End: 2024-12-16 | Stop reason: HOSPADM

## 2024-12-16 RX ORDER — LIDOCAINE HYDROCHLORIDE 10 MG/ML
INJECTION, SOLUTION EPIDURAL; INFILTRATION; INTRACAUDAL; PERINEURAL PRN
Status: DISCONTINUED | OUTPATIENT
Start: 2024-12-16 | End: 2024-12-16 | Stop reason: ALTCHOICE

## 2024-12-16 RX ORDER — SODIUM CHLORIDE 0.9 % (FLUSH) 0.9 %
5-40 SYRINGE (ML) INJECTION EVERY 12 HOURS SCHEDULED
Status: DISCONTINUED | OUTPATIENT
Start: 2024-12-16 | End: 2024-12-16 | Stop reason: HOSPADM

## 2024-12-16 RX ORDER — PHENAZOPYRIDINE HYDROCHLORIDE 100 MG/1
100 TABLET, FILM COATED ORAL ONCE
Status: COMPLETED | OUTPATIENT
Start: 2024-12-16 | End: 2024-12-16

## 2024-12-16 RX ORDER — LISINOPRIL 10 MG/1
10 TABLET ORAL NIGHTLY
Status: DISCONTINUED | OUTPATIENT
Start: 2024-12-16 | End: 2024-12-17 | Stop reason: HOSPADM

## 2024-12-16 RX ORDER — SODIUM CHLORIDE, SODIUM LACTATE, POTASSIUM CHLORIDE, CALCIUM CHLORIDE 600; 310; 30; 20 MG/100ML; MG/100ML; MG/100ML; MG/100ML
INJECTION, SOLUTION INTRAVENOUS CONTINUOUS
Status: DISCONTINUED | OUTPATIENT
Start: 2024-12-16 | End: 2024-12-17

## 2024-12-16 RX ORDER — LIDOCAINE HYDROCHLORIDE 10 MG/ML
INJECTION, SOLUTION EPIDURAL; INFILTRATION; INTRACAUDAL; PERINEURAL
Status: DISCONTINUED | OUTPATIENT
Start: 2024-12-16 | End: 2024-12-16 | Stop reason: SDUPTHER

## 2024-12-16 RX ORDER — LIDOCAINE HYDROCHLORIDE AND EPINEPHRINE 10; 10 MG/ML; UG/ML
INJECTION, SOLUTION INFILTRATION; PERINEURAL PRN
Status: DISCONTINUED | OUTPATIENT
Start: 2024-12-16 | End: 2024-12-16 | Stop reason: ALTCHOICE

## 2024-12-16 RX ORDER — FAMOTIDINE 10 MG/ML
INJECTION, SOLUTION INTRAVENOUS
Status: COMPLETED
Start: 2024-12-16 | End: 2024-12-16

## 2024-12-16 RX ORDER — SODIUM CHLORIDE, SODIUM LACTATE, POTASSIUM CHLORIDE, CALCIUM CHLORIDE 600; 310; 30; 20 MG/100ML; MG/100ML; MG/100ML; MG/100ML
INJECTION, SOLUTION INTRAVENOUS
Status: DISCONTINUED | OUTPATIENT
Start: 2024-12-16 | End: 2024-12-16 | Stop reason: SDUPTHER

## 2024-12-16 RX ORDER — DEXAMETHASONE SODIUM PHOSPHATE 10 MG/ML
INJECTION, SOLUTION INTRAMUSCULAR; INTRAVENOUS
Status: DISCONTINUED | OUTPATIENT
Start: 2024-12-16 | End: 2024-12-16 | Stop reason: SDUPTHER

## 2024-12-16 RX ORDER — MIDAZOLAM HYDROCHLORIDE 2 MG/2ML
2 INJECTION, SOLUTION INTRAMUSCULAR; INTRAVENOUS ONCE
Status: CANCELLED | OUTPATIENT
Start: 2024-12-16 | End: 2024-12-16

## 2024-12-16 RX ORDER — APREPITANT 40 MG/1
CAPSULE ORAL
Status: COMPLETED
Start: 2024-12-16 | End: 2024-12-16

## 2024-12-16 RX ORDER — FENTANYL CITRATE 50 UG/ML
100 INJECTION, SOLUTION INTRAMUSCULAR; INTRAVENOUS
Status: COMPLETED | OUTPATIENT
Start: 2024-12-16 | End: 2024-12-16

## 2024-12-16 RX ORDER — ENOXAPARIN SODIUM 100 MG/ML
40 INJECTION SUBCUTANEOUS ONCE
Status: COMPLETED | OUTPATIENT
Start: 2024-12-16 | End: 2024-12-16

## 2024-12-16 RX ORDER — ONDANSETRON 4 MG/1
4 TABLET, ORALLY DISINTEGRATING ORAL EVERY 8 HOURS PRN
Status: DISCONTINUED | OUTPATIENT
Start: 2024-12-16 | End: 2024-12-17 | Stop reason: HOSPADM

## 2024-12-16 RX ORDER — PROPOFOL 10 MG/ML
INJECTION, EMULSION INTRAVENOUS
Status: DISCONTINUED | OUTPATIENT
Start: 2024-12-16 | End: 2024-12-16 | Stop reason: SDUPTHER

## 2024-12-16 RX ORDER — FENTANYL CITRATE 50 UG/ML
INJECTION, SOLUTION INTRAMUSCULAR; INTRAVENOUS
Status: DISCONTINUED | OUTPATIENT
Start: 2024-12-16 | End: 2024-12-16 | Stop reason: SDUPTHER

## 2024-12-16 RX ORDER — ONDANSETRON 2 MG/ML
4 INJECTION INTRAMUSCULAR; INTRAVENOUS EVERY 6 HOURS PRN
Status: DISCONTINUED | OUTPATIENT
Start: 2024-12-16 | End: 2024-12-17 | Stop reason: HOSPADM

## 2024-12-16 RX ORDER — SODIUM CHLORIDE 9 MG/ML
INJECTION, SOLUTION INTRAVENOUS PRN
Status: DISCONTINUED | OUTPATIENT
Start: 2024-12-16 | End: 2024-12-17 | Stop reason: HOSPADM

## 2024-12-16 RX ORDER — NALOXONE HYDROCHLORIDE 0.4 MG/ML
INJECTION, SOLUTION INTRAMUSCULAR; INTRAVENOUS; SUBCUTANEOUS PRN
Status: DISCONTINUED | OUTPATIENT
Start: 2024-12-16 | End: 2024-12-16 | Stop reason: HOSPADM

## 2024-12-16 RX ORDER — METOCLOPRAMIDE HYDROCHLORIDE 5 MG/ML
10 INJECTION INTRAMUSCULAR; INTRAVENOUS
Status: DISCONTINUED | OUTPATIENT
Start: 2024-12-16 | End: 2024-12-16 | Stop reason: HOSPADM

## 2024-12-16 RX ORDER — LISINOPRIL 10 MG/1
10 TABLET ORAL DAILY
Status: DISCONTINUED | OUTPATIENT
Start: 2024-12-17 | End: 2024-12-16

## 2024-12-16 RX ORDER — OXYCODONE AND ACETAMINOPHEN 5; 325 MG/1; MG/1
1 TABLET ORAL
Status: DISCONTINUED | OUTPATIENT
Start: 2024-12-16 | End: 2024-12-16 | Stop reason: HOSPADM

## 2024-12-16 RX ORDER — BUPIVACAINE HYDROCHLORIDE 2.5 MG/ML
INJECTION, SOLUTION EPIDURAL; INFILTRATION; INTRACAUDAL
Status: COMPLETED | OUTPATIENT
Start: 2024-12-16 | End: 2024-12-16

## 2024-12-16 RX ORDER — CALCIUM CARBONATE 500 MG/1
500 TABLET, CHEWABLE ORAL 3 TIMES DAILY PRN
Status: DISCONTINUED | OUTPATIENT
Start: 2024-12-16 | End: 2024-12-17 | Stop reason: HOSPADM

## 2024-12-16 RX ORDER — IPRATROPIUM BROMIDE AND ALBUTEROL SULFATE 2.5; .5 MG/3ML; MG/3ML
1 SOLUTION RESPIRATORY (INHALATION)
Status: DISCONTINUED | OUTPATIENT
Start: 2024-12-16 | End: 2024-12-16 | Stop reason: HOSPADM

## 2024-12-16 RX ORDER — OXYCODONE AND ACETAMINOPHEN 5; 325 MG/1; MG/1
2 TABLET ORAL
Status: DISCONTINUED | OUTPATIENT
Start: 2024-12-16 | End: 2024-12-16 | Stop reason: HOSPADM

## 2024-12-16 RX ORDER — DIPHENHYDRAMINE HYDROCHLORIDE 50 MG/ML
INJECTION INTRAMUSCULAR; INTRAVENOUS
Status: DISCONTINUED | OUTPATIENT
Start: 2024-12-16 | End: 2024-12-16 | Stop reason: SDUPTHER

## 2024-12-16 RX ORDER — SENNA AND DOCUSATE SODIUM 50; 8.6 MG/1; MG/1
2 TABLET, FILM COATED ORAL NIGHTLY
Status: DISCONTINUED | OUTPATIENT
Start: 2024-12-16 | End: 2024-12-17 | Stop reason: HOSPADM

## 2024-12-16 RX ORDER — PAROXETINE 20 MG/1
20 TABLET, FILM COATED ORAL DAILY
Status: DISCONTINUED | OUTPATIENT
Start: 2024-12-17 | End: 2024-12-17 | Stop reason: HOSPADM

## 2024-12-16 RX ORDER — MEPERIDINE HYDROCHLORIDE 50 MG/ML
12.5 INJECTION INTRAMUSCULAR; INTRAVENOUS; SUBCUTANEOUS EVERY 5 MIN PRN
Status: DISCONTINUED | OUTPATIENT
Start: 2024-12-16 | End: 2024-12-16 | Stop reason: HOSPADM

## 2024-12-16 RX ORDER — ACETAMINOPHEN 500 MG
1000 TABLET ORAL EVERY 6 HOURS
Status: DISCONTINUED | OUTPATIENT
Start: 2024-12-16 | End: 2024-12-17 | Stop reason: HOSPADM

## 2024-12-16 RX ORDER — ONDANSETRON 4 MG/1
4 TABLET, ORALLY DISINTEGRATING ORAL EVERY 4 HOURS PRN
Qty: 20 TABLET | Refills: 0 | Status: SHIPPED | OUTPATIENT
Start: 2024-12-16

## 2024-12-16 RX ORDER — KETOROLAC TROMETHAMINE 30 MG/ML
INJECTION, SOLUTION INTRAMUSCULAR; INTRAVENOUS
Status: DISCONTINUED | OUTPATIENT
Start: 2024-12-16 | End: 2024-12-16 | Stop reason: SDUPTHER

## 2024-12-16 RX ORDER — FUROSEMIDE 10 MG/ML
INJECTION INTRAMUSCULAR; INTRAVENOUS
Status: COMPLETED
Start: 2024-12-16 | End: 2024-12-16

## 2024-12-16 RX ORDER — ROCURONIUM BROMIDE 10 MG/ML
INJECTION, SOLUTION INTRAVENOUS
Status: DISCONTINUED | OUTPATIENT
Start: 2024-12-16 | End: 2024-12-16 | Stop reason: SDUPTHER

## 2024-12-16 RX ORDER — HYDRALAZINE HYDROCHLORIDE 20 MG/ML
10 INJECTION INTRAMUSCULAR; INTRAVENOUS
Status: DISCONTINUED | OUTPATIENT
Start: 2024-12-16 | End: 2024-12-16 | Stop reason: HOSPADM

## 2024-12-16 RX ORDER — LIDOCAINE HYDROCHLORIDE AND EPINEPHRINE 10; 10 MG/ML; UG/ML
INJECTION, SOLUTION INFILTRATION; PERINEURAL
Status: DISCONTINUED
Start: 2024-12-16 | End: 2024-12-16

## 2024-12-16 RX ORDER — LABETALOL HYDROCHLORIDE 5 MG/ML
10 INJECTION, SOLUTION INTRAVENOUS
Status: DISCONTINUED | OUTPATIENT
Start: 2024-12-16 | End: 2024-12-16 | Stop reason: HOSPADM

## 2024-12-16 RX ORDER — IBUPROFEN 600 MG/1
600 TABLET, FILM COATED ORAL EVERY 6 HOURS
Status: DISCONTINUED | OUTPATIENT
Start: 2024-12-17 | End: 2024-12-17 | Stop reason: HOSPADM

## 2024-12-16 RX ORDER — SENNA AND DOCUSATE SODIUM 50; 8.6 MG/1; MG/1
2 TABLET, FILM COATED ORAL NIGHTLY PRN
Qty: 30 TABLET | Refills: 1 | Status: SHIPPED | OUTPATIENT
Start: 2024-12-16

## 2024-12-16 RX ORDER — SODIUM CHLORIDE 0.9 % (FLUSH) 0.9 %
5-40 SYRINGE (ML) INJECTION PRN
Status: DISCONTINUED | OUTPATIENT
Start: 2024-12-16 | End: 2024-12-17 | Stop reason: HOSPADM

## 2024-12-16 RX ORDER — LIDOCAINE HYDROCHLORIDE 10 MG/ML
INJECTION, SOLUTION INFILTRATION; PERINEURAL
Status: COMPLETED
Start: 2024-12-16 | End: 2024-12-16

## 2024-12-16 RX ORDER — PROCHLORPERAZINE EDISYLATE 5 MG/ML
10 INJECTION INTRAMUSCULAR; INTRAVENOUS EVERY 6 HOURS PRN
Status: DISCONTINUED | OUTPATIENT
Start: 2024-12-16 | End: 2024-12-17 | Stop reason: HOSPADM

## 2024-12-16 RX ORDER — GABAPENTIN 300 MG/1
300 CAPSULE ORAL ONCE
Status: COMPLETED | OUTPATIENT
Start: 2024-12-16 | End: 2024-12-16

## 2024-12-16 RX ORDER — MORPHINE SULFATE 4 MG/ML
4 INJECTION, SOLUTION INTRAMUSCULAR; INTRAVENOUS
Status: DISCONTINUED | OUTPATIENT
Start: 2024-12-16 | End: 2024-12-17

## 2024-12-16 RX ORDER — ALPRAZOLAM 0.25 MG/1
0.5 TABLET ORAL NIGHTLY PRN
Status: DISCONTINUED | OUTPATIENT
Start: 2024-12-16 | End: 2024-12-17 | Stop reason: HOSPADM

## 2024-12-16 RX ORDER — SCOLOPAMINE TRANSDERMAL SYSTEM 1 MG/1
1 PATCH, EXTENDED RELEASE TRANSDERMAL
Status: DISCONTINUED | OUTPATIENT
Start: 2024-12-16 | End: 2024-12-17 | Stop reason: HOSPADM

## 2024-12-16 RX ORDER — MIDAZOLAM HYDROCHLORIDE 2 MG/2ML
2 INJECTION, SOLUTION INTRAMUSCULAR; INTRAVENOUS
Status: COMPLETED | OUTPATIENT
Start: 2024-12-16 | End: 2024-12-16

## 2024-12-16 RX ORDER — KETOROLAC TROMETHAMINE 30 MG/ML
30 INJECTION, SOLUTION INTRAMUSCULAR; INTRAVENOUS EVERY 6 HOURS
Status: COMPLETED | OUTPATIENT
Start: 2024-12-16 | End: 2024-12-17

## 2024-12-16 RX ADMIN — SODIUM CHLORIDE, PRESERVATIVE FREE 10 ML: 5 INJECTION INTRAVENOUS at 07:02

## 2024-12-16 RX ADMIN — KETOROLAC TROMETHAMINE 30 MG: 30 INJECTION, SOLUTION INTRAMUSCULAR at 18:32

## 2024-12-16 RX ADMIN — SIMETHICONE 80 MG: 80 TABLET, CHEWABLE ORAL at 21:25

## 2024-12-16 RX ADMIN — LIDOCAINE HYDROCHLORIDE 50 MG: 10 INJECTION, SOLUTION EPIDURAL; INFILTRATION; INTRACAUDAL; PERINEURAL at 07:50

## 2024-12-16 RX ADMIN — ENOXAPARIN SODIUM 40 MG: 100 INJECTION SUBCUTANEOUS at 14:38

## 2024-12-16 RX ADMIN — SENNOSIDES AND DOCUSATE SODIUM 2 TABLET: 50; 8.6 TABLET ORAL at 20:59

## 2024-12-16 RX ADMIN — FENTANYL CITRATE 50 MCG: 50 INJECTION, SOLUTION INTRAMUSCULAR; INTRAVENOUS at 10:54

## 2024-12-16 RX ADMIN — ROCURONIUM BROMIDE 50 MG: 10 INJECTION, SOLUTION INTRAVENOUS at 07:50

## 2024-12-16 RX ADMIN — SODIUM CHLORIDE, POTASSIUM CHLORIDE, SODIUM LACTATE AND CALCIUM CHLORIDE: 600; 310; 30; 20 INJECTION, SOLUTION INTRAVENOUS at 11:46

## 2024-12-16 RX ADMIN — SODIUM CHLORIDE, POTASSIUM CHLORIDE, SODIUM LACTATE AND CALCIUM CHLORIDE: 600; 310; 30; 20 INJECTION, SOLUTION INTRAVENOUS at 08:49

## 2024-12-16 RX ADMIN — MIDAZOLAM HYDROCHLORIDE 2 MG: 1 INJECTION, SOLUTION INTRAMUSCULAR; INTRAVENOUS at 07:34

## 2024-12-16 RX ADMIN — Medication 2000 MG: at 20:59

## 2024-12-16 RX ADMIN — FENTANYL CITRATE 25 MCG: 50 INJECTION, SOLUTION INTRAMUSCULAR; INTRAVENOUS at 11:39

## 2024-12-16 RX ADMIN — PHENAZOPYRIDINE HYDROCHLORIDE 100 MG: 100 TABLET ORAL at 06:30

## 2024-12-16 RX ADMIN — ACETAMINOPHEN 1000 MG: 500 TABLET ORAL at 20:59

## 2024-12-16 RX ADMIN — ROCURONIUM BROMIDE 20 MG: 10 INJECTION, SOLUTION INTRAVENOUS at 08:35

## 2024-12-16 RX ADMIN — FENTANYL CITRATE 50 MCG: 50 INJECTION, SOLUTION INTRAMUSCULAR; INTRAVENOUS at 10:21

## 2024-12-16 RX ADMIN — PROPOFOL 200 MG: 10 INJECTION, EMULSION INTRAVENOUS at 07:50

## 2024-12-16 RX ADMIN — ROCURONIUM BROMIDE 10 MG: 10 INJECTION, SOLUTION INTRAVENOUS at 10:08

## 2024-12-16 RX ADMIN — APREPITANT 40 MG: 40 CAPSULE ORAL at 07:02

## 2024-12-16 RX ADMIN — FENTANYL CITRATE 100 MCG: 50 INJECTION, SOLUTION INTRAMUSCULAR; INTRAVENOUS at 07:34

## 2024-12-16 RX ADMIN — Medication 2000 MG: at 11:50

## 2024-12-16 RX ADMIN — SODIUM CHLORIDE, POTASSIUM CHLORIDE, SODIUM LACTATE AND CALCIUM CHLORIDE: 600; 310; 30; 20 INJECTION, SOLUTION INTRAVENOUS at 07:00

## 2024-12-16 RX ADMIN — KETOROLAC TROMETHAMINE 30 MG: 30 INJECTION, SOLUTION INTRAMUSCULAR at 11:37

## 2024-12-16 RX ADMIN — GABAPENTIN 300 MG: 300 CAPSULE ORAL at 06:30

## 2024-12-16 RX ADMIN — EPHEDRINE SULFATE 5 MG: 5 INJECTION INTRAVENOUS at 08:58

## 2024-12-16 RX ADMIN — FENTANYL CITRATE 25 MCG: 50 INJECTION, SOLUTION INTRAMUSCULAR; INTRAVENOUS at 11:47

## 2024-12-16 RX ADMIN — SODIUM CHLORIDE, PRESERVATIVE FREE 10 ML: 5 INJECTION INTRAVENOUS at 21:25

## 2024-12-16 RX ADMIN — DEXAMETHASONE SODIUM PHOSPHATE 10 MG: 10 INJECTION INTRAMUSCULAR; INTRAVENOUS at 07:58

## 2024-12-16 RX ADMIN — FUROSEMIDE 10 MG: 10 INJECTION, SOLUTION INTRAMUSCULAR; INTRAVENOUS at 09:58

## 2024-12-16 RX ADMIN — DIPHENHYDRAMINE HYDROCHLORIDE 12.5 MG: 50 INJECTION INTRAMUSCULAR; INTRAVENOUS at 07:58

## 2024-12-16 RX ADMIN — FAMOTIDINE 20 MG: 10 INJECTION, SOLUTION INTRAVENOUS at 07:02

## 2024-12-16 RX ADMIN — SUGAMMADEX 200 MG: 100 INJECTION, SOLUTION INTRAVENOUS at 11:46

## 2024-12-16 RX ADMIN — EPHEDRINE SULFATE 5 MG: 5 INJECTION INTRAVENOUS at 08:46

## 2024-12-16 RX ADMIN — BUPIVACAINE HYDROCHLORIDE 80 ML: 2.5 INJECTION, SOLUTION EPIDURAL; INFILTRATION; INTRACAUDAL; PERINEURAL at 07:40

## 2024-12-16 RX ADMIN — ACETAMINOPHEN 1000 MG: 500 TABLET ORAL at 14:38

## 2024-12-16 RX ADMIN — Medication 2000 MG: at 08:02

## 2024-12-16 ASSESSMENT — PAIN - FUNCTIONAL ASSESSMENT
PAIN_FUNCTIONAL_ASSESSMENT: NONE - DENIES PAIN
PAIN_FUNCTIONAL_ASSESSMENT: NONE - DENIES PAIN
PAIN_FUNCTIONAL_ASSESSMENT: 0-10

## 2024-12-16 ASSESSMENT — PAIN DESCRIPTION - DESCRIPTORS
DESCRIPTORS: ACHING
DESCRIPTORS: ACHING

## 2024-12-16 ASSESSMENT — PAIN DESCRIPTION - LOCATION
LOCATION: ABDOMEN
LOCATION: ABDOMEN

## 2024-12-16 ASSESSMENT — PAIN SCALES - GENERAL
PAINLEVEL_OUTOF10: 3
PAINLEVEL_OUTOF10: 3

## 2024-12-16 NOTE — ANESTHESIA PRE PROCEDURE
Resp: 12   Temp: 97.1 °F (36.2 °C)   TempSrc: Infrared   SpO2: 96%   Weight: 112.9 kg (249 lb)   Height: 1.854 m (6' 1\")                                              BP Readings from Last 3 Encounters:   12/16/24 128/84   12/02/24 131/79   10/18/24 120/80       NPO Status: Time of last liquid consumption: 0445                        Time of last solid consumption: 2300                        Date of last liquid consumption: 12/16/24                        Date of last solid food consumption: 12/15/24    BMI:   Wt Readings from Last 3 Encounters:   12/16/24 112.9 kg (249 lb)   12/13/24 113.9 kg (251 lb)   12/02/24 116.1 kg (256 lb)     Body mass index is 32.85 kg/m².    CBC:   Lab Results   Component Value Date/Time    WBC 6.4 12/02/2024 10:52 AM    RBC 5.21 12/02/2024 10:52 AM    RBC 3.60 12/04/2011 05:04 AM    HGB 14.6 12/02/2024 10:52 AM    HCT 46.8 12/02/2024 10:52 AM    MCV 89.8 12/02/2024 10:52 AM    RDW 12.7 12/02/2024 10:52 AM     12/02/2024 10:52 AM     12/04/2011 05:04 AM       CMP:   Lab Results   Component Value Date/Time     12/02/2024 10:52 AM    K 4.8 12/02/2024 10:52 AM     12/02/2024 10:52 AM    CO2 27 12/02/2024 10:52 AM    BUN 16 12/02/2024 10:52 AM    CREATININE 0.8 12/02/2024 10:52 AM    GFRAA >60 11/02/2021 07:38 AM    LABGLOM 89 12/02/2024 10:52 AM    LABGLOM >60 03/13/2024 07:45 AM    GLUCOSE 98 12/02/2024 10:52 AM    GLUCOSE 107 12/04/2011 05:04 AM    CALCIUM 9.4 12/02/2024 10:52 AM    BILITOT 0.3 03/13/2024 07:45 AM    ALKPHOS 66 03/13/2024 07:45 AM    AST 11 03/13/2024 07:45 AM    ALT 15 03/13/2024 07:45 AM       POC Tests: No results for input(s): \"POCGLU\", \"POCNA\", \"POCK\", \"POCCL\", \"POCBUN\", \"POCHEMO\", \"POCHCT\" in the last 72 hours.    Coags:   Lab Results   Component Value Date/Time    PROTIME 10.4 12/31/2017 02:38 PM    PROTIME 12.4 12/02/2011 07:09 AM    INR 1.0 12/31/2017 02:38 PM    APTT 33.2 12/02/2011 07:09 AM       HCG (If Applicable):   Lab Results

## 2024-12-16 NOTE — PROGRESS NOTES
Spiritual Health History and Assessment/Progress Note  [facility]    (P) Initial Encounter,  ,  ,      Name: Shital Aaron MRN: [id number]    Age: 54 y.o.     Sex: female   Language: English   Roman Catholic: Bahai   Postoperative state     Date: 12/16/2024            Total Time Calculated: (P) 21 min              Spiritual Assessment began in [address]        Referral/Consult From: (P) Rounding   Encounter Overview/Reason: (P) Initial Encounter  Service Provided For: (P) Patient    PT was receptive to visit.  PT has a hysterectomy because of fibroid tumors.  PT is recovering well.  PT is a teacher.  PT's 82 year old mom came in from [place] to stay with her as she recovers.  PT is returning to [place] with mom to stay with her over the holidays.  PT has a daughter who is in school at .  Daughter is also great support for here.  PT talked about how she loves her students.  PT is a wonderful person and a trent to visit.    Liz, Belief, Meaning:   Patient identifies as spiritual and has beliefs or practices that help with coping during difficult times  Family/Friends No family/friends present      Importance and Influence:  Patient has spiritual/personal beliefs that influence decisions regarding their health  Family/Friends No family/friends present    Community:  Patient is connected with a spiritual community and feels well-supported. Support system includes: Parent/s and Children  Family/Friends No family/friends present    Assessment and Plan of Care:     Patient Interventions include: Facilitated expression of thoughts and feelings  Family/Friends Interventions include: No family/friends present    Patient Plan of Care: Spiritual Care available upon further referral  Family/Friends Plan of Care: No family/friends present    Electronically signed by Alberto Franco  Intern on 12/16/2024 at 3:55 PM

## 2024-12-16 NOTE — ANESTHESIA POSTPROCEDURE EVALUATION
Department of Anesthesiology  Postprocedure Note    Patient: Shital Aaron  MRN: [id number]  YOB: 1970  Date of evaluation: 12/16/2024    Procedure Summary       Date: 12/16/24 Room / Location: [address]    Anesthesia Start: 0744 Anesthesia Stop: 1214    Procedure: DIFFICULT LAPAROSCOPIC ROBOTIC ASSISTED HYSTERECTOMY, MINI LAPAROTOMY, EXTRAPERITONEAL MORCELLATION OF FIBROID UTERUS, BILATERAL SALPINGECTOMY, LEFT OOPHRECTOMY WITH ENTEROLYISI, CYSTOSCOPY WITH FILLING CMG AND LYNX SLING Diagnosis:       Urinary, incontinence, stress female      Uterine leiomyoma, unspecified location      (Urinary, incontinence, stress female [N39.3])      (Uterine leiomyoma, unspecified location [D25.9])    Surgeons: Hemanth Allen DO Responsible Provider: Torey Christy MD    Anesthesia Type: general, regional ASA Status: 3            Anesthesia Type: No value filed.    Kitty Phase I: Kitty Score: 9    Kitty Phase II:      Anesthesia Post Evaluation    Patient location during evaluation: PACU  Patient participation: complete - patient participated  Level of consciousness: awake and alert  Airway patency: patent  Nausea & Vomiting: no vomiting and no nausea  Cardiovascular status: hemodynamically stable  Respiratory status: room air and spontaneous ventilation  Hydration status: euvolemic  Multimodal analgesia pain management approach  Pain management: adequate    No notable events documented.

## 2024-12-16 NOTE — BRIEF OP NOTE
stable, transferred to post anesthesia recovery      Anisa Cid DO  Urogynecology Resident  12/16/2024, 12:00 PM

## 2024-12-16 NOTE — DISCHARGE SUMMARY
Urogynecology Discharge Summary  [facility]      Patient Name: Shital Aaron  Patient : 1970  Primary Care Physician: Myranda Diego APRN - CNP  Admit Date: 2024    Principal Diagnosis: Uterine fibroids, stress urinary incontinence     Other Diagnosis:   Urinary, incontinence, stress female [N39.3]  Uterine leiomyoma, unspecified location [D25.9]  Postoperative state [Z98.890]  Patient Active Problem List   Diagnosis    Essential hypertension    Anxiety    Hiatal hernia with GERD    Fibroid uterus    Menorrhagia with regular cycle    S/p RALH, BS, LO, mini lap, extraperitoneal uterine fibroid morcellation, cysto, Lynx sling 24       Infection: No  Hospital Acquired: N/A    Surgical Operations & Procedures: RALH, BS, LO, mini lap, extraperitoneal uterine fibroid morcellation, cysto, Lynx sling 24    Consultations: Anesthesia    Pertinent Findings & Procedures:   Shital Aaron is a 54 y.o. female , admitted for elective surgery; received Ancef, Pyridium, Scop patch, Gabapentin pre-op.  She underwent  RALH, BS, LO, mini lap, extraperitoneal uterine fibroid morcellation, cysto, Lynx sling  on 24.  POD#0 labs were stable. Patient admitted overnight due to difficult and prolonged surgery. Post-op course normal, discharged home on POD# 1.  Follow up in 1 week. Discharge instructions reviewed and questions answered.    Course of patient:   POD#1: CBC and BMP stable. Void trial passed Discharged home without john catheter    Discharge to: Home    Readmission planned: No    Recommendations on Discharge:     Medications:     Medication List        START taking these medications      cephALEXin 500 MG capsule  Commonly known as: KEFLEX  Take 1 capsule by mouth 2 times daily for 7 days Take for 5 days if discharged home without john catheter. Take for 7 days if discharged home with john catheter.     ibuprofen 600 MG tablet  Commonly known as:

## 2024-12-16 NOTE — PROGRESS NOTES
Gynecology Progress Note    Date: 2024  Time: 7:08 AM    Shital Aaron 54 y.o. female , POD # 1 s/p PO TINAJERO, LO, mini lap, extraperitoneal uterine fibroid morcellation, cysto, Lynx sling 24    Patient seen and examined. She complained of intermittent self resolving right sided shoulder pain, otherwise no complaints. She denies any lightheadedness, shortness of breath, or chest pain. She does feel like her throat is sore and is coughing up some phlegm, likely related to intubation. No evidence of respiratory infection. Pain is controlled with PO medications.  Patient is  tolerating oral intake. She is making adequate urine output via john catheter.  She denies any vaginal bleeding. She has not yet ambulated. Encouraged her to do so this morning. She is  passing flatus. She denies Fever/Chills, Chest Pain, SOB, N/V, Calf Pain.    The patient recalls from preoperative counseling and accepts that up to 26% of women may persist in these symptoms or develop de masood incontinence. If preoperative bladder testing was negative, there was no indication for an incontinence procedure. The patient understands if these symptoms persist, further treatment may be recommended.    Vitals:  Vitals:    24 1322 24 2045 24 2340 24 0335   BP: (!) 107/55 (!) 108/55 104/64 108/60   Pulse: 70 86 80 83   Resp: 14  17   Temp: 97.6 °F (36.4 °C) 98.2 °F (36.8 °C) 98.1 °F (36.7 °C) 98.4 °F (36.9 °C)   TempSrc: Oral Oral Oral Oral   SpO2: 93% 91% 92% 94%   Weight:       Height:             Intake/Output:   Last Shift: I/O last 3 completed shifts:  In: 2480 [P.O.:480; I.V.:2000]  Out: 1375 [Urine:975; Blood:400]  Current Shift: No intake/output data recorded.  825 mL out in last 11 hrs ~ 75mL/hr      Physical Exam:  General:  no apparent distress, alert and cooperative  Neurologic:  alert, oriented, normal speech, no focal findings or movement disorder noted  Lungs:  No increased work of

## 2024-12-16 NOTE — DISCHARGE INSTRUCTIONS
[facility] UROGYNECOLOGY & PELVIC REHABILITATION     POSTOPERATIVE PATIENT INSTRUCTIONS  MAJOR & MINOR SURGICAL PROCEDURES      Congratulations! You have taken the brave step of undergoing surgery in order to try to improve your health.  Now it is time to focus on healing outside of the hospital / surgery center setting.  To make your dismissal as successful as possible, it is recommended that you thoroughly review these postoperative instructions. These instructions pertain to, but are not limited to the following procedures:      MAJOR   Hysterectomy - Vaginal / Laparoscopic / Robotic   With or Without tube-ovarian Removal (Salpingo-oophorectomy)   Sacrocolpopexy / Sacroperineopexy / Sacrocervicopexy/ Hysteropexy (lifting apex to sacrum)  Major Vaginal Prolapse repairs   Cystocele repair (Anterior Colporrhaphy)   Rectocele repair (Posterior Colporrhaphy)   Enterocele repair    Vaginal vault repair   Closing or removal of the vagina (Colpocleisis / Colpectomy)   Major urinary incontinence surgery (Sr Urethropexy, MMK)   Major fibroid removal (Myomectomy)   Major tubal surgery (re-anastomosis, ectopic pregnancy, pelvic inflammatory disease)   Major surgery for adhesions or endometriosis involving bowel   Major vaginal mesh removal    Fistula repair of the bladder or colorectum to the vagina   Creation of a new vagina (Neovaginoplasty) or repair of a Mullerian Anomaly   Other       MINOR   Hysteroscopy with Dilatation / Curettage, Endometrial Ablation / Hysterosalpingogram (HSG)  Laparoscopy With or Without minor tubal or ovarian surgery   Minor Vaginal Prolapse repairs   Cystocele repair (Anterior Colporrhaphy)   Rectocele repair (Posterior Colporrhaphy)   Urethrocele repair    Minor urinary incontinence surgery (Slings, Transurethral Bulking)   Minor vaginal surgery (Mesh removal, Laser ablation, Biopsies, Labial revisions, Injections)    Minor surgery of the bladder (DMSO, Hydrodistention, Botox, etc.)

## 2024-12-16 NOTE — ANESTHESIA PROCEDURE NOTES
Peripheral Block    Patient location during procedure: pre-op  Reason for block: post-op pain management and at surgeon's request  Start time: 12/16/2024 7:36 AM  End time: 12/16/2024 7:40 AM  Staffing  Performed: anesthesiologist   Anesthesiologist: Torey Christy MD  Performed by: Torey Christy MD  Authorized by: Torey Christy MD    Preanesthetic Checklist  Completed: patient identified, IV checked, site marked, risks and benefits discussed, surgical/procedural consents, equipment checked, pre-op evaluation, timeout performed, anesthesia consent given, oxygen available, monitors applied/VS acknowledged, fire risk safety assessment completed and verbalized and blood product R/B/A discussed and consented  Peripheral Block   Patient position: supine  Prep: ChloraPrep  Provider prep: mask and sterile gloves  Patient monitoring: cardiac monitor, continuous pulse ox, frequent blood pressure checks, IV access, oxygen and responsive to questions  Block type: TAP  Laterality: bilateral  Injection technique: single-shot  Guidance: ultrasound guided    Needle   Needle type: insulated echogenic nerve stimulator needle   Needle gauge: 20 G  Needle localization: anatomical landmarks and ultrasound guidance  Needle length: 4 IN.  Assessment   Injection assessment: negative aspiration for heme, no paresthesia on injection, local visualized surrounding nerve on ultrasound and no intravascular symptoms  Paresthesia pain: none  Slow fractionated injection: yes  Hemodynamics: stable  Outcomes: uncomplicated and patient tolerated procedure well    Additional Notes  10MG DECADRON ADDED TO LOCAL ANESTHETIC SOLUTION  Medications Administered  BUPivacaine (MARCAINE) PF injection 0.25% - Perineural   80 mL - 12/16/2024 7:40:00 AM

## 2024-12-16 NOTE — OP NOTE
critical and key portions of the entire surgery/procedure/ OR management and I was immediately available to assist during resident training as appropriate for the case.  All external incisions were sealed with appropriate bandaging. A post operative time out occurred. The patient was transferred to a post anesthesia bed and was taken to the recovery unit in stable fashion. An inspection of the patient's head, neck, torso, and limbs revealed no undue strain swelling, bruising, or edema .The patient tolerated the procedure well. An attempt to discuss the outcome of the surgery with family/friends/caregivers was done by personal consultation.    The patient will be admitted overnight and a voiding trial done in the AM. The patient has confirmed review and understanding of paper postoperative instructions given to them by the office. The patient  was given another link to the video version of the instructions and friends and family were shown the video links as well if needed. The patient will receive a welfare check phone call at week's end, and has a postoperative appointment confirmed in 1-2 weeks. The patient will go home on standard postoperative medicines and may start most to all home medicines with possible exception of aggressive anticoagulants. The patient understands how to contact my service with any problems or questions.    For bladder procedures: The patient may expect some pink discharge or microhematuria for the first week after cystourethroscopy, especially if on aspirin or a blood thinner. Urethral stinging or burning should resolve within 48 hours. The patient knows to call if she has gross bleeding, dysuria, or symptoms of a UTI.            Electronically signed by Hemanth Allen DO on 12/16/2024 at 12:03 PM

## 2024-12-16 NOTE — PROGRESS NOTES
Gynecology Progress Note    Date: 2024  Time: 5:27 PM    Shital Aaron 54 y.o. female , POD # 0 s/p PO TINAJERO, LO, mini lap, extraperitoneal uterine fibroid morcellation, cysto, Lynx sling 24     Patient seen and examined. She has no chief complaint. Pain is controlled.  Patient is tolerating sips of clears. She is making adequate urine output via john catheter.  She denies any vaginal bleeding. She has not yet ambulated.  She is not passing flatus. She denies Fever/Chills, Chest Pain, SOB, N/V, Calf Pain.    The patient recalls from preoperative counseling and accepts that up to 26% of women may persist in these symptoms or develop de masood incontinence. If preoperative bladder testing was negative, there was no indication for an incontinence procedure. The patient understands if these symptoms persist, further treatment may be recommended.    Vitals:  Vitals:    24 1230 24 1245 24 1300 24 1322   BP: 109/63 115/66 (!) 111/59 (!) 107/55   Pulse: 76 71 67 70   Resp: 15 13  14   Temp:    97.6 °F (36.4 °C)   TempSrc:    Oral   SpO2: 94% 93% 93% 93%   Weight:       Height:             Intake/Output:   Last Shift: No intake/output data recorded.  Current Shift: I/O this shift:  In:  [I.V.:]  Out: 550 [Urine:150; Blood:400]  200 mL out in last 4 hrs ~ 50 mL/hr      Physical Exam:  General:  no apparent distress, alert and cooperative  Neurologic:  alert, oriented, normal speech, no focal findings or movement disorder noted  Lungs:  No increased work of breathing, good air exchange  Heart:  Regular rate  Abdomen: soft, non-distended, appropriate tenderness, no rebound guarding or rigidity  Incision: clean, dry, intact with Dermabond over top laparoscopic and sling incisions and Silver dressing over top of mini laparotomy. Small amount of swelling and bruising around left superior incision consistent with small hematoma. No active bleeding. Will continue to

## 2024-12-16 NOTE — H&P
sores on the body. An informed consent has been signed under no duress or confusion and was reviewed one final time.  All questions were answered to the patient's satisfaction.      The patient confirmed they read written information and instructions prior to their surgery as part of their responsibility in understanding their surgery and recuperation: Yes    The patient confirmed they watched video information and instructions prior to their surgery as part of their responsibility in understanding their surgery and recuperation: Yes          NAT

## 2024-12-16 NOTE — FLOWSHEET NOTE
7 puncture sites with glue, dry and intact  Longer incision noted to low abdomen with aquacal dressing, some bloody drainage marked

## 2024-12-17 VITALS
WEIGHT: 249 LBS | HEART RATE: 86 BPM | TEMPERATURE: 98.1 F | RESPIRATION RATE: 17 BRPM | BODY MASS INDEX: 33.72 KG/M2 | DIASTOLIC BLOOD PRESSURE: 68 MMHG | SYSTOLIC BLOOD PRESSURE: 104 MMHG | HEIGHT: 72 IN | OXYGEN SATURATION: 99 %

## 2024-12-17 LAB
ANION GAP SERPL CALCULATED.3IONS-SCNC: 8 MMOL/L (ref 9–17)
BASOPHILS # BLD: 0 K/UL (ref 0–0.2)
BASOPHILS NFR BLD: 0 % (ref 0–2)
BUN SERPL-MCNC: 16 MG/DL (ref 6–20)
CALCIUM SERPL-MCNC: 8 MG/DL (ref 8.6–10.4)
CHLORIDE SERPL-SCNC: 107 MMOL/L (ref 98–107)
CO2 SERPL-SCNC: 23 MMOL/L (ref 20–31)
CREAT SERPL-MCNC: 0.9 MG/DL (ref 0.5–0.9)
EOSINOPHIL # BLD: 0 K/UL (ref 0–0.4)
EOSINOPHILS RELATIVE PERCENT: 0 % (ref 1–4)
ERYTHROCYTE [DISTWIDTH] IN BLOOD BY AUTOMATED COUNT: 13.7 % (ref 12.5–15.4)
GFR, ESTIMATED: 77 ML/MIN/1.73M2
GLUCOSE SERPL-MCNC: 123 MG/DL (ref 70–99)
HCT VFR BLD AUTO: 34.5 % (ref 36–46)
HGB BLD-MCNC: 11.5 G/DL (ref 12–16)
LYMPHOCYTES NFR BLD: 1.5 K/UL (ref 1–4.8)
LYMPHOCYTES RELATIVE PERCENT: 14 % (ref 24–44)
MCH RBC QN AUTO: 28.7 PG (ref 26–34)
MCHC RBC AUTO-ENTMCNC: 33.4 G/DL (ref 31–37)
MCV RBC AUTO: 85.9 FL (ref 80–100)
MONOCYTES NFR BLD: 1.2 K/UL (ref 0.1–1.2)
MONOCYTES NFR BLD: 12 % (ref 2–11)
NEUTROPHILS NFR BLD: 74 % (ref 36–66)
NEUTS SEG NFR BLD: 8 K/UL (ref 1.8–7.7)
PLATELET # BLD AUTO: 211 K/UL (ref 140–450)
PMV BLD AUTO: 9.9 FL (ref 6–12)
POTASSIUM SERPL-SCNC: 4.2 MMOL/L (ref 3.7–5.3)
RBC # BLD AUTO: 4.01 M/UL (ref 4–5.2)
SODIUM SERPL-SCNC: 138 MMOL/L (ref 135–144)
WBC OTHER # BLD: 10.8 K/UL (ref 3.5–11)

## 2024-12-17 PROCEDURE — 6370000000 HC RX 637 (ALT 250 FOR IP): Performed by: STUDENT IN AN ORGANIZED HEALTH CARE EDUCATION/TRAINING PROGRAM

## 2024-12-17 PROCEDURE — 96375 TX/PRO/DX INJ NEW DRUG ADDON: CPT

## 2024-12-17 PROCEDURE — 6360000002 HC RX W HCPCS: Performed by: STUDENT IN AN ORGANIZED HEALTH CARE EDUCATION/TRAINING PROGRAM

## 2024-12-17 PROCEDURE — 96374 THER/PROPH/DIAG INJ IV PUSH: CPT

## 2024-12-17 PROCEDURE — 85025 COMPLETE CBC W/AUTO DIFF WBC: CPT

## 2024-12-17 PROCEDURE — 96376 TX/PRO/DX INJ SAME DRUG ADON: CPT

## 2024-12-17 PROCEDURE — G0378 HOSPITAL OBSERVATION PER HR: HCPCS

## 2024-12-17 PROCEDURE — 36415 COLL VENOUS BLD VENIPUNCTURE: CPT

## 2024-12-17 PROCEDURE — 51798 US URINE CAPACITY MEASURE: CPT

## 2024-12-17 PROCEDURE — 80048 BASIC METABOLIC PNL TOTAL CA: CPT

## 2024-12-17 RX ORDER — GABAPENTIN 300 MG/1
300 CAPSULE ORAL 3 TIMES DAILY PRN
Status: DISCONTINUED | OUTPATIENT
Start: 2024-12-17 | End: 2024-12-17 | Stop reason: HOSPADM

## 2024-12-17 RX ADMIN — Medication 2000 MG: at 06:13

## 2024-12-17 RX ADMIN — ACETAMINOPHEN 1000 MG: 500 TABLET ORAL at 01:56

## 2024-12-17 RX ADMIN — IBUPROFEN 600 MG: 600 TABLET, FILM COATED ORAL at 12:52

## 2024-12-17 RX ADMIN — ONDANSETRON 4 MG: 4 TABLET, ORALLY DISINTEGRATING ORAL at 14:26

## 2024-12-17 RX ADMIN — KETOROLAC TROMETHAMINE 30 MG: 30 INJECTION, SOLUTION INTRAMUSCULAR at 06:13

## 2024-12-17 RX ADMIN — PANTOPRAZOLE SODIUM 40 MG: 40 TABLET, DELAYED RELEASE ORAL at 06:13

## 2024-12-17 RX ADMIN — ACETAMINOPHEN 1000 MG: 500 TABLET ORAL at 15:03

## 2024-12-17 RX ADMIN — SIMETHICONE 80 MG: 80 TABLET, CHEWABLE ORAL at 01:55

## 2024-12-17 RX ADMIN — ACETAMINOPHEN 1000 MG: 500 TABLET ORAL at 10:26

## 2024-12-17 RX ADMIN — PAROXETINE HYDROCHLORIDE 20 MG: 20 TABLET, FILM COATED ORAL at 10:27

## 2024-12-17 RX ADMIN — SIMETHICONE 80 MG: 80 TABLET, CHEWABLE ORAL at 06:13

## 2024-12-17 RX ADMIN — SIMETHICONE 80 MG: 80 TABLET, CHEWABLE ORAL at 12:52

## 2024-12-17 RX ADMIN — KETOROLAC TROMETHAMINE 30 MG: 30 INJECTION, SOLUTION INTRAMUSCULAR at 00:06

## 2024-12-17 NOTE — CARE COORDINATION
Case Management Assessment  Initial Evaluation    Date/Time of Evaluation: 12/17/2024 8:37 AM  Assessment Completed by: Elda Mcdonald RN    If patient is discharged prior to next notation, then this note serves as note for discharge by case management.    Patient Name: Shital Aaron                   YOB: 1970  Diagnosis: Urinary, incontinence, stress female [N39.3]  Uterine leiomyoma, unspecified location [D25.9]  Postoperative state [Z98.890]                   Date / Time: 12/16/2024  5:47 AM    Patient Admission Status: Observation   Readmission Risk (Low < 19, Mod (19-27), High > 27): No data recorded  Current PCP: Myranda Diego APRN - CNP  PCP verified by CM? Yes    Chart Reviewed: Yes      History Provided by: Patient  Patient Orientation: Alert and Oriented    Patient Cognition: Alert    Hospitalization in the last 30 days (Readmission):  No    If yes, Readmission Assessment in  Navigator will be completed.    Advance Directives:      Code Status: Full Code   Patient's Primary Decision Maker is: Legal Next of Kin      Discharge Planning:    Patient lives with: Alone Type of Home: House  Primary Care Giver: Self  Patient Support Systems include: Parent, Children, Family Members, Friends/Neighbors   Current Financial resources: Other (Comment) ([facility] [place])  Current community resources: None  Current services prior to admission: Durable Medical Equipment            Current DME: Shower Chair, Other (Comment) (raised toilet seat with handles)            Type of Home Care services:  None    ADLS  Prior functional level: Independent in ADLs/IADLs  Current functional level: Independent in ADLs/IADLs    PT AM-PAC:   /24  OT AM-PAC:   /24    Family can provide assistance at DC: Yes  Would you like Case Management to discuss the discharge plan with any other family members/significant others, and if so, who? No  Plans to Return to Present Housing: Yes  Other Identified Issues/Barriers

## 2024-12-19 ENCOUNTER — TELEPHONE (OUTPATIENT)
Dept: OBGYN | Age: 54
End: 2024-12-19

## 2024-12-19 DIAGNOSIS — Z98.890 POSTOPERATIVE STATE: Primary | ICD-10-CM

## 2024-12-19 LAB — SURGICAL PATHOLOGY REPORT: NORMAL

## 2024-12-19 NOTE — TELEPHONE ENCOUNTER
Date:  2024   Time:  9:26 AM   Patient:  Shital Aaron   :  1970   Procedure: Difficult Laparoscopic Robotic Assisted Hysterectomy, Mini Laparotomy, Extraperitoneal Morcellation Of Fibroid Uterus, Bilateral Salpingectomy, Left Oophrectomy With Enterolyisi, Cystoscopy With Filling Cmg And Lynx Sling   Procedure Date: 2024   Post-Op visit Scheduled for follow up? 24        Comments: Left detailed message informing patient to call if any concerns or questions. The patient has a standing postop appointment and was informed she may call to reconfirm or change it as necessary. The patient was informed how to reach the on call

## 2024-12-22 NOTE — PROGRESS NOTES
[facility], [facility] UROGYNECOLOGY AND PELVIC REHABILITATION   [address] [place] [address]  Dept: [phone]   Patient:  Shital Aaron   :  1970   Visit Date:  2024     CC: 1-2 week postoperative exam.     HPI:  Patient is here for a post operative exam. She feels that she is doing well. BM's moving well, She does feel that she is emptying her bladder well. She denies any fever, chest pain or SOB, no LE pain/swelling or tingling. No fever, no vaginal bleeding - occasional vaginal spotting.     Status post: Difficult Laparoscopic Robotic Assisted Hysterectomy, Mini Laparotomy, Extraperitoneal Morcellation Of Fibroid Uterus, Bilateral Salpingectomy, Left Oophrectomy With Enterolyisi, Cystoscopy With Filling Cmg And Lynx Sling   Date: 2024     SIGNIFICANT FINDINGS:   Surgical Pathology Report   Date Value Ref Range Status   2024       Path Number: [id number]    -- Diagnosis --  CERVIX, UTERUS, BILATERAL FALLOPIAN TUBES AND LEFT OVARY, HYSTERECTOMY  WITH BILATERAL SALPINGECTOMY AND LEFT OOPHORECTOMY:    CERVIX:  - NO SIGNIFICANT PATHOLOGIC CHANGE IDENTIFIED.    UTERUS:  - BENIGN ENDOMETRIUM.  - LEIOMYOMAS.    FALLOPIAN TUBES:  - HYDROSALPINX, BILATERAL, WITH ADHESIONS.    LEFT OVARY:  - ENDOMETRIOSIS (ENDOMETRIOMA) WITH SURFACE FIBROUS ADHESIONS.       Jose Simon M.D.  **Electronically Signed Out**         sls/2024     Clinical Information  Pre-op Diagnosis:  URINARY INCONTINENCE, STRESS FEMALE, UTERINE  LEIOMYOMA, UNSPECIFIED LOCATION   Operative Findings:  UTERUS, CERVIX, BILATERAL HYDROSALPINX, LEFT  OVARY  Operation Performed:  DIFFICULT LAPAROSCOPIC ROBOTIC ASSISTED  HYSTERECTOMY, MINI LAPAROTOMY, EXTRAPERITONEAL MORCELLATION OF FIBROID  UTERUS, BILATERAL SALPINGECTOMY, LEFT OOPHORECTOMY WITH ENTEROLYSIS,  CYSTOSCOPY WITH FILLING CMG AND LYNX SLING  tm         Source of Specimen  A: UTERUS,

## 2024-12-23 ENCOUNTER — OFFICE VISIT (OUTPATIENT)
Age: 54
End: 2024-12-23
Payer: COMMERCIAL

## 2024-12-23 VITALS
HEART RATE: 98 BPM | DIASTOLIC BLOOD PRESSURE: 82 MMHG | OXYGEN SATURATION: 100 % | SYSTOLIC BLOOD PRESSURE: 126 MMHG | TEMPERATURE: 98 F

## 2024-12-23 DIAGNOSIS — R33.9 URINARY RETENTION: ICD-10-CM

## 2024-12-23 DIAGNOSIS — Z09 POSTOPERATIVE EXAMINATION: Primary | ICD-10-CM

## 2024-12-23 LAB
BILIRUBIN, POC: NORMAL
BLOOD URINE, POC: NORMAL
CLARITY, POC: NORMAL
COLOR, POC: NORMAL
GLUCOSE URINE, POC: NORMAL MG/DL
KETONES, POC: NORMAL MG/DL
LEUKOCYTE EST, POC: 75
NITRITE, POC: NORMAL
PH, POC: 5
PROTEIN, POC: 30 MG/DL
SPECIFIC GRAVITY, POC: 1.02
UROBILINOGEN, POC: NORMAL MG/DL

## 2024-12-23 PROCEDURE — 99024 POSTOP FOLLOW-UP VISIT: CPT

## 2024-12-23 PROCEDURE — 81003 URINALYSIS AUTO W/O SCOPE: CPT

## 2024-12-23 PROCEDURE — A4351 STRAIGHT TIP URINE CATHETER: HCPCS

## 2024-12-23 ASSESSMENT — ENCOUNTER SYMPTOMS
SHORTNESS OF BREATH: 0
CHEST TIGHTNESS: 0
GASTROINTESTINAL NEGATIVE: 1

## 2024-12-23 NOTE — PROGRESS NOTES
One week post-op    Surgery Type: n/a  Surgery Date: n/a    Date urinary cathter was pulled\" n/a  Voiding w/o difficulty:no    Level of pain: 4  Pain medication;   Ibuprofen: 600mg  Tylenol: 500mg  Norco:not taking, date of last dose: n/a  Percocet: not taking, date of last dose: n/a    Stool softener: not taking  Last bowel movement: 12/22/2024  Bowel movement description: loose/ diarrhea    Antibiotics given: yes  Antibiotics completed: yes    Did patient use:   Simethicone no  Flomax no    Vaginal bleeding: no  Discharge: no

## 2025-01-23 NOTE — PROGRESS NOTES
[facility] UROGYNECOLOGY AND PELVIC REHABILITATION   [address] [place] [address]  Dept: [phone]   Patient:  Shital Aaron    :  1970   Visit Date:  2025     VISIT - FINAL OUTPATIENT POST OP EXAM  CC: The patient presents for a final outpatient postoperative exam. had concerns including Post-Op Check (Pt states she needs an release to go back to work on Monday with not restriction. ).    Chaperone present for entire visit and pertinent physical exam: Resident    Will send today's note to PCP / referral provider.    HPI:   History of Present Illness  The patient presents for a postoperative visit.    She reports a significant improvement in her condition following the surgery, with a notable decrease in discomfort and pressure. She has been able to maintain better control over her urination and experiences no issues with bladder emptying. There have been no instances of urinary tract infections post-surgery. Her bowel movements remain regular and unproblematic. She has been receiving lymphatic massage therapy from a licensed massage therapist, which has proven beneficial in managing lymphatic fluid and swelling, contributing positively to her overall well-being. She is scheduled to resume work on Monday and is seeking a release to return to work without restrictions, although she acknowledges the need to avoid heavy lifting for some time. She expresses satisfaction with the decision to proceed with an open surgery, given the potential complications that could have arisen from a laparoscopic approach. She is currently in the process of transitioning between physicians.    SOCIAL HISTORY  She works as a teacher.       Status post: Difficult Laparoscopic Robotic Assisted Hysterectomy, Mini Laparotomy, Extraperitoneal Morcellation Of Fibroid Uterus, Bilateral Salpingectomy, Left Oophrectomy With Enterolyisi,

## 2025-01-24 ENCOUNTER — OFFICE VISIT (OUTPATIENT)
Age: 55
End: 2025-01-24

## 2025-01-24 VITALS
OXYGEN SATURATION: 98 % | SYSTOLIC BLOOD PRESSURE: 130 MMHG | DIASTOLIC BLOOD PRESSURE: 84 MMHG | HEART RATE: 95 BPM | TEMPERATURE: 97.5 F

## 2025-01-24 DIAGNOSIS — Z09 POSTOPERATIVE EXAMINATION: Primary | ICD-10-CM

## 2025-01-24 PROCEDURE — 99024 POSTOP FOLLOW-UP VISIT: CPT | Performed by: OBSTETRICS & GYNECOLOGY

## 2025-04-29 ENCOUNTER — HOSPITAL ENCOUNTER (OUTPATIENT)
Age: 55
Setting detail: SPECIMEN
Discharge: HOME | End: 2025-04-29

## 2025-04-29 DIAGNOSIS — E66.09 CLASS 1 OBESITY DUE TO EXCESS CALORIES WITH SERIOUS COMORBIDITY AND BODY MASS INDEX (BMI) OF 32.0 TO 32.9 IN ADULT: ICD-10-CM

## 2025-04-29 DIAGNOSIS — Z01.89 ENCOUNTER FOR ROUTINE LABORATORY TESTING: ICD-10-CM

## 2025-04-29 DIAGNOSIS — Z13.220 SCREENING FOR HYPERLIPIDEMIA: ICD-10-CM

## 2025-04-29 DIAGNOSIS — I10 ESSENTIAL HYPERTENSION: ICD-10-CM

## 2025-04-29 DIAGNOSIS — E66.811 CLASS 1 OBESITY DUE TO EXCESS CALORIES WITH SERIOUS COMORBIDITY AND BODY MASS INDEX (BMI) OF 32.0 TO 32.9 IN ADULT: ICD-10-CM

## 2025-04-29 DIAGNOSIS — E55.9 VITAMIN D INSUFFICIENCY: ICD-10-CM

## 2025-04-29 DIAGNOSIS — F41.9 ANXIETY: ICD-10-CM

## 2025-04-29 DIAGNOSIS — Z13.1 SCREENING FOR DIABETES MELLITUS: ICD-10-CM

## 2025-04-29 LAB
25(OH)D3 SERPL-MCNC: 24.7 NG/ML (ref 30–100)
ALBUMIN SERPL-MCNC: 4.4 G/DL (ref 3.5–5.2)
ALBUMIN/GLOB SERPL: 1.8 {RATIO} (ref 1–2.5)
ALP SERPL-CCNC: 81 U/L (ref 35–104)
ALT SERPL-CCNC: 18 U/L (ref 10–35)
ANION GAP SERPL CALCULATED.3IONS-SCNC: 10 MMOL/L (ref 9–16)
AST SERPL-CCNC: 16 U/L (ref 10–35)
BASOPHILS # BLD: 0.06 K/UL (ref 0–0.2)
BASOPHILS NFR BLD: 1 % (ref 0–2)
BILIRUB SERPL-MCNC: 0.2 MG/DL (ref 0–1.2)
BUN SERPL-MCNC: 17 MG/DL (ref 6–20)
CALCIUM SERPL-MCNC: 9.4 MG/DL (ref 8.6–10.4)
CHLORIDE SERPL-SCNC: 104 MMOL/L (ref 98–107)
CHOLEST SERPL-MCNC: 194 MG/DL (ref 0–199)
CHOLESTEROL/HDL RATIO: 4.5
CO2 SERPL-SCNC: 27 MMOL/L (ref 20–31)
CREAT SERPL-MCNC: 0.8 MG/DL (ref 0.6–0.9)
EOSINOPHIL # BLD: 0.22 K/UL (ref 0–0.44)
EOSINOPHILS RELATIVE PERCENT: 2 % (ref 1–4)
ERYTHROCYTE [DISTWIDTH] IN BLOOD BY AUTOMATED COUNT: 13.6 % (ref 11.8–14.4)
GFR, ESTIMATED: 89 ML/MIN/1.73M2
GLUCOSE SERPL-MCNC: 102 MG/DL (ref 74–99)
HCT VFR BLD AUTO: 41.3 % (ref 36.3–47.1)
HDLC SERPL-MCNC: 43 MG/DL
HGB BLD-MCNC: 12.3 G/DL (ref 11.9–15.1)
IMM GRANULOCYTES # BLD AUTO: <0.03 K/UL (ref 0–0.3)
IMM GRANULOCYTES NFR BLD: 0 %
LDLC SERPL CALC-MCNC: 108 MG/DL (ref 0–100)
LYMPHOCYTES NFR BLD: 2.1 K/UL (ref 1.1–3.7)
LYMPHOCYTES RELATIVE PERCENT: 23 % (ref 24–43)
MCH RBC QN AUTO: 25.9 PG (ref 25.2–33.5)
MCHC RBC AUTO-ENTMCNC: 29.8 G/DL (ref 28.4–34.8)
MCV RBC AUTO: 86.9 FL (ref 82.6–102.9)
MONOCYTES NFR BLD: 0.57 K/UL (ref 0.1–1.2)
MONOCYTES NFR BLD: 6 % (ref 3–12)
NEUTROPHILS NFR BLD: 68 % (ref 36–65)
NEUTS SEG NFR BLD: 6.31 K/UL (ref 1.5–8.1)
NRBC BLD-RTO: 0 PER 100 WBC
PLATELET # BLD AUTO: 323 K/UL (ref 138–453)
PMV BLD AUTO: 11.2 FL (ref 8.1–13.5)
POTASSIUM SERPL-SCNC: 4.3 MMOL/L (ref 3.7–5.3)
PROT SERPL-MCNC: 6.9 G/DL (ref 6.6–8.7)
RBC # BLD AUTO: 4.75 M/UL (ref 3.95–5.11)
SODIUM SERPL-SCNC: 141 MMOL/L (ref 136–145)
TRIGL SERPL-MCNC: 215 MG/DL
VLDLC SERPL CALC-MCNC: 43 MG/DL (ref 1–30)
WBC OTHER # BLD: 9.3 K/UL (ref 3.5–11.3)

## 2025-06-30 NOTE — PROGRESS NOTES
[facility], [facility] UROGYNECOLOGY AND PELVIC REHABILITATION   [address] [place] [address]  Dept: [phone]   Patient:  Shital Aaron    :  1970   Visit Date:  2025     VISIT - 6 MONTH OUTPATIENT POST OP EXAM  CC: The patient presents for a final outpatient postoperative exam. had no chief complaint listed for this encounter.    Chaperone present for entire visit and pertinent physical exam: Resident    Will send today's note to PCP / referral provider.    HPI:   History of Present Illness  The patient presents for a postoperative check.    She reports feeling well overall. Her blood pressure was recorded as 111/72, which she attributes to discontinuing her antihypertensive medication. She was advised to stop the medication due to low blood pressure readings during her hospital stay. Despite not resuming the medication, her blood pressure has remained stable. She is currently seeking a new primary care physician as her previous one has transitioned to a teaching role. She reports no issues with bowel or bladder function and notes that her surgical site has healed well.       Status post: Difficult Laparoscopic Robotic Assisted Hysterectomy, Mini Laparotomy, Extraperitoneal Morcellation Of Fibroid Uterus, Bilateral Salpingectomy, Left Oophrectomy With Enterolyisi, Cystoscopy With Filling Cmg And Lynx Sling   Date: 2024   Pathology benign. Significant findings: None  Satisfaction: very satisfied    Vitals and signs or symptoms of UTI or infection were assessed. The presence of fevers, nausea, vomiting, chest pain, shortness of breath, or calf pain were evaluated.  Postoperative pain was evaluated.  Bowel and bladder habits were reviewed.  Any remaining vaginal discharge and/or bleeding was confirmed. Medications and compliance were reviewed.    ROS:  Review of Systems    All other systems negative.    PHYSICAL

## 2025-07-03 ENCOUNTER — OFFICE VISIT (OUTPATIENT)
Age: 55
End: 2025-07-03

## 2025-07-03 VITALS
DIASTOLIC BLOOD PRESSURE: 71 MMHG | TEMPERATURE: 97.3 F | SYSTOLIC BLOOD PRESSURE: 112 MMHG | OXYGEN SATURATION: 98 % | HEART RATE: 97 BPM

## 2025-07-03 DIAGNOSIS — N81.84 PELVIC MUSCLE WASTING: Primary | ICD-10-CM

## 2025-07-09 VITALS — WEIGHT: 247 LBS | HEIGHT: 72 IN | BODY MASS INDEX: 33.46 KG/M2

## 2025-07-09 NOTE — PRE-PROCEDURE INSTRUCTIONS
PAT Phone Interview Instructions     ARRIVE AT [facility] ON Friday, 7/25/25 at 0545 AM    Once you enter the hospital lobby, take the elevators to the second floor.  Check-In is at the surgery registration desk.      Continue to take your home medications as you normally do up to and including the night before surgery with the exception of any blood thinning medications.      Blood Thinning Medications:  Please stop prescription blood thinning medications such as Apixaban (Eliquis); Clopidogrel (Plavix); Dabigatran (Pradaxa); Prasugrel (Effient); Rivaroxaban (Xarelto); Ticagrelor (Brilinta); Warfarin (Coumadin) only as directed by your surgeon and/or the prescribing physician    Some common examples of other medications that can thin your blood are: Aspirin, Ibuprofen (Advil, Motrin), Naproxen (Aleve), Meloxicam (Mobic), Celecoxib (Celebrex), Fish Oil, many Herbal Supplements.  These medications should usually be stopped at least 7 days prior to surgery.    Motrin     Tylenol is OK to take for pain the week prior to surgery.    Failure to stop certain medications may interfere with your scheduled surgery.    If you receive instructions from your surgeon regarding what medications to stop prior to surgery, please follow those specific instructions.      Please take the following medication(s) the day of surgery with a small sip of water:  Pantoprazole, paroxetine.    If you are on medicare and there is a possibility that you will be admitted following surgery:   Please bring your prescription medications in their original bottles with pharmacy label on the day of surgery.    PREPARING FOR YOUR SURGERY:     Before surgery, you can play an important role in your own health. Because skin is not sterile, we need to be sure that your skin is as free of germs as possible before surgery by carefully washing before surgery.  Preparing or “prepping” skin before surgery can reduce the risk of a “surgical site

## 2025-07-10 ENCOUNTER — HOSPITAL ENCOUNTER (OUTPATIENT)
Dept: PREADMISSION TESTING | Age: 55
Discharge: HOME | End: 2025-07-14

## 2025-07-24 ENCOUNTER — ANESTHESIA EVENT (OUTPATIENT)
Dept: OPERATING ROOM | Age: 55
End: 2025-07-24
Payer: COMMERCIAL

## 2025-07-25 ENCOUNTER — APPOINTMENT (OUTPATIENT)
Dept: GENERAL RADIOLOGY | Age: 55
End: 2025-07-25
Attending: PODIATRIST
Payer: COMMERCIAL

## 2025-07-25 ENCOUNTER — ANESTHESIA (OUTPATIENT)
Dept: OPERATING ROOM | Age: 55
End: 2025-07-25
Payer: COMMERCIAL

## 2025-07-25 ENCOUNTER — HOSPITAL ENCOUNTER (OUTPATIENT)
Age: 55
Setting detail: OUTPATIENT SURGERY
Discharge: HOME | End: 2025-07-25
Attending: PODIATRIST | Admitting: PODIATRIST
Payer: COMMERCIAL

## 2025-07-25 VITALS
HEIGHT: 72 IN | TEMPERATURE: 97.7 F | HEART RATE: 93 BPM | RESPIRATION RATE: 17 BRPM | BODY MASS INDEX: 33.59 KG/M2 | OXYGEN SATURATION: 95 % | SYSTOLIC BLOOD PRESSURE: 138 MMHG | WEIGHT: 248 LBS | DIASTOLIC BLOOD PRESSURE: 76 MMHG

## 2025-07-25 DIAGNOSIS — Z98.890 POST-OPERATIVE STATE: ICD-10-CM

## 2025-07-25 DIAGNOSIS — G89.18 POST-OP PAIN: Primary | ICD-10-CM

## 2025-07-25 PROCEDURE — 73630 X-RAY EXAM OF FOOT: CPT

## 2025-07-25 PROCEDURE — 3700000000 HC ANESTHESIA ATTENDED CARE: Performed by: PODIATRIST

## 2025-07-25 PROCEDURE — 73650 X-RAY EXAM OF HEEL: CPT

## 2025-07-25 PROCEDURE — 3700000001 HC ADD 15 MINUTES (ANESTHESIA): Performed by: PODIATRIST

## 2025-07-25 PROCEDURE — C1713 ANCHOR/SCREW BN/BN,TIS/BN: HCPCS | Performed by: PODIATRIST

## 2025-07-25 PROCEDURE — 6360000002 HC RX W HCPCS: Performed by: PODIATRIST

## 2025-07-25 PROCEDURE — 73610 X-RAY EXAM OF ANKLE: CPT

## 2025-07-25 PROCEDURE — 6360000002 HC RX W HCPCS: Performed by: ANESTHESIOLOGY

## 2025-07-25 PROCEDURE — 6370000000 HC RX 637 (ALT 250 FOR IP): Performed by: ANESTHESIOLOGY

## 2025-07-25 PROCEDURE — 2580000003 HC RX 258: Performed by: ANESTHESIOLOGY

## 2025-07-25 PROCEDURE — 6360000002 HC RX W HCPCS: Performed by: SPECIALIST

## 2025-07-25 PROCEDURE — 7100000011 HC PHASE II RECOVERY - ADDTL 15 MIN: Performed by: PODIATRIST

## 2025-07-25 PROCEDURE — 7100000001 HC PACU RECOVERY - ADDTL 15 MIN: Performed by: PODIATRIST

## 2025-07-25 PROCEDURE — 7100000000 HC PACU RECOVERY - FIRST 15 MIN: Performed by: PODIATRIST

## 2025-07-25 PROCEDURE — 3600000002 HC SURGERY LEVEL 2 BASE: Performed by: PODIATRIST

## 2025-07-25 PROCEDURE — 2720000010 HC SURG SUPPLY STERILE: Performed by: PODIATRIST

## 2025-07-25 PROCEDURE — 64447 NJX AA&/STRD FEMORAL NRV IMG: CPT | Performed by: ANESTHESIOLOGY

## 2025-07-25 PROCEDURE — 2709999900 HC NON-CHARGEABLE SUPPLY: Performed by: PODIATRIST

## 2025-07-25 PROCEDURE — 3600000012 HC SURGERY LEVEL 2 ADDTL 15MIN: Performed by: PODIATRIST

## 2025-07-25 PROCEDURE — 7100000010 HC PHASE II RECOVERY - FIRST 15 MIN: Performed by: PODIATRIST

## 2025-07-25 PROCEDURE — 2500000003 HC RX 250 WO HCPCS: Performed by: SPECIALIST

## 2025-07-25 DEVICE — IMPLANTABLE DEVICE: Type: IMPLANTABLE DEVICE | Status: FUNCTIONAL

## 2025-07-25 DEVICE — DX FIBERTAK SUTURE ANCHOR, ST & NDLS
Type: IMPLANTABLE DEVICE | Site: ANKLE | Status: FUNCTIONAL
Brand: ARTHREX®

## 2025-07-25 DEVICE — 7.0 X 55 MM HEADED, CANNULATED, SHORT THREAD SCREW
Type: IMPLANTABLE DEVICE | Status: FUNCTIONAL
Brand: MONSTER SCREW SYSTEM

## 2025-07-25 DEVICE — GRAFT BNE WDG ADDUCTION STRUCTURAL EVANS 25DEG THICKNESS: Type: IMPLANTABLE DEVICE | Site: ANKLE | Status: FUNCTIONAL

## 2025-07-25 RX ORDER — LIDOCAINE HYDROCHLORIDE 20 MG/ML
INJECTION, SOLUTION EPIDURAL; INFILTRATION; INTRACAUDAL; PERINEURAL
Status: DISCONTINUED | OUTPATIENT
Start: 2025-07-25 | End: 2025-07-25 | Stop reason: SDUPTHER

## 2025-07-25 RX ORDER — BUPIVACAINE HYDROCHLORIDE 5 MG/ML
INJECTION, SOLUTION EPIDURAL; INTRACAUDAL; PERINEURAL
Status: COMPLETED | OUTPATIENT
Start: 2025-07-25 | End: 2025-07-25

## 2025-07-25 RX ORDER — PROPOFOL 10 MG/ML
INJECTION, EMULSION INTRAVENOUS
Status: DISCONTINUED | OUTPATIENT
Start: 2025-07-25 | End: 2025-07-25 | Stop reason: SDUPTHER

## 2025-07-25 RX ORDER — LIDOCAINE HYDROCHLORIDE 10 MG/ML
1 INJECTION, SOLUTION EPIDURAL; INFILTRATION; INTRACAUDAL; PERINEURAL
Status: DISCONTINUED | OUTPATIENT
Start: 2025-07-26 | End: 2025-07-25 | Stop reason: HOSPADM

## 2025-07-25 RX ORDER — SODIUM CHLORIDE 0.9 % (FLUSH) 0.9 %
5-40 SYRINGE (ML) INJECTION EVERY 12 HOURS SCHEDULED
Status: DISCONTINUED | OUTPATIENT
Start: 2025-07-25 | End: 2025-07-25 | Stop reason: HOSPADM

## 2025-07-25 RX ORDER — SODIUM CHLORIDE 9 MG/ML
INJECTION, SOLUTION INTRAVENOUS PRN
Status: DISCONTINUED | OUTPATIENT
Start: 2025-07-25 | End: 2025-07-25 | Stop reason: HOSPADM

## 2025-07-25 RX ORDER — SODIUM CHLORIDE 9 MG/ML
INJECTION, SOLUTION INTRAVENOUS CONTINUOUS
Status: DISCONTINUED | OUTPATIENT
Start: 2025-07-25 | End: 2025-07-25 | Stop reason: HOSPADM

## 2025-07-25 RX ORDER — OXYCODONE AND ACETAMINOPHEN 5; 325 MG/1; MG/1
1 TABLET ORAL EVERY 6 HOURS PRN
Qty: 28 TABLET | Refills: 0 | Status: SHIPPED | OUTPATIENT
Start: 2025-07-25 | End: 2025-07-25

## 2025-07-25 RX ORDER — SODIUM CHLORIDE 0.9 % (FLUSH) 0.9 %
5-40 SYRINGE (ML) INJECTION PRN
Status: DISCONTINUED | OUTPATIENT
Start: 2025-07-25 | End: 2025-07-25 | Stop reason: HOSPADM

## 2025-07-25 RX ORDER — SCOPOLAMINE 1 MG/3D
1 PATCH, EXTENDED RELEASE TRANSDERMAL ONCE
Status: DISCONTINUED | OUTPATIENT
Start: 2025-07-25 | End: 2025-07-25 | Stop reason: HOSPADM

## 2025-07-25 RX ORDER — ONDANSETRON 4 MG/1
4 TABLET, ORALLY DISINTEGRATING ORAL 3 TIMES DAILY PRN
Qty: 21 TABLET | Refills: 0 | Status: SHIPPED | OUTPATIENT
Start: 2025-07-25

## 2025-07-25 RX ORDER — KETOROLAC TROMETHAMINE 10 MG/1
10 TABLET, FILM COATED ORAL EVERY 6 HOURS PRN
Qty: 20 TABLET | Refills: 0 | Status: SHIPPED | OUTPATIENT
Start: 2025-07-25 | End: 2025-07-30

## 2025-07-25 RX ORDER — OXYCODONE AND ACETAMINOPHEN 5; 325 MG/1; MG/1
1 TABLET ORAL EVERY 6 HOURS PRN
Qty: 28 TABLET | Refills: 0 | Status: SHIPPED | OUTPATIENT
Start: 2025-07-25 | End: 2025-08-01

## 2025-07-25 RX ORDER — METOCLOPRAMIDE HYDROCHLORIDE 5 MG/ML
10 INJECTION INTRAMUSCULAR; INTRAVENOUS
Status: DISCONTINUED | OUTPATIENT
Start: 2025-07-25 | End: 2025-07-25 | Stop reason: HOSPADM

## 2025-07-25 RX ORDER — KETOROLAC TROMETHAMINE 10 MG/1
10 TABLET, FILM COATED ORAL EVERY 6 HOURS PRN
Qty: 20 TABLET | Refills: 0 | Status: SHIPPED | OUTPATIENT
Start: 2025-07-25 | End: 2025-07-25

## 2025-07-25 RX ORDER — PROMETHAZINE HYDROCHLORIDE 12.5 MG/1
12.5 TABLET ORAL ONCE
Status: COMPLETED | OUTPATIENT
Start: 2025-07-25 | End: 2025-07-25

## 2025-07-25 RX ORDER — DEXAMETHASONE SODIUM PHOSPHATE 10 MG/ML
INJECTION, SOLUTION INTRAMUSCULAR; INTRAVENOUS
Status: DISCONTINUED | OUTPATIENT
Start: 2025-07-25 | End: 2025-07-25 | Stop reason: SDUPTHER

## 2025-07-25 RX ORDER — ONDANSETRON 2 MG/ML
4 INJECTION INTRAMUSCULAR; INTRAVENOUS
Status: DISCONTINUED | OUTPATIENT
Start: 2025-07-25 | End: 2025-07-25 | Stop reason: HOSPADM

## 2025-07-25 RX ORDER — SODIUM CHLORIDE, SODIUM LACTATE, POTASSIUM CHLORIDE, CALCIUM CHLORIDE 600; 310; 30; 20 MG/100ML; MG/100ML; MG/100ML; MG/100ML
INJECTION, SOLUTION INTRAVENOUS CONTINUOUS
Status: DISCONTINUED | OUTPATIENT
Start: 2025-07-25 | End: 2025-07-25 | Stop reason: HOSPADM

## 2025-07-25 RX ORDER — HYDROMORPHONE HYDROCHLORIDE 1 MG/ML
0.5 INJECTION, SOLUTION INTRAMUSCULAR; INTRAVENOUS; SUBCUTANEOUS EVERY 5 MIN PRN
Status: DISCONTINUED | OUTPATIENT
Start: 2025-07-25 | End: 2025-07-25 | Stop reason: HOSPADM

## 2025-07-25 RX ORDER — KETOROLAC TROMETHAMINE 30 MG/ML
INJECTION, SOLUTION INTRAMUSCULAR; INTRAVENOUS
Status: DISCONTINUED | OUTPATIENT
Start: 2025-07-25 | End: 2025-07-25 | Stop reason: SDUPTHER

## 2025-07-25 RX ORDER — APREPITANT 40 MG/1
40 CAPSULE ORAL ONCE
Status: COMPLETED | OUTPATIENT
Start: 2025-07-25 | End: 2025-07-25

## 2025-07-25 RX ORDER — ROCURONIUM BROMIDE 10 MG/ML
INJECTION, SOLUTION INTRAVENOUS
Status: DISCONTINUED | OUTPATIENT
Start: 2025-07-25 | End: 2025-07-25 | Stop reason: SDUPTHER

## 2025-07-25 RX ORDER — FENTANYL CITRATE 50 UG/ML
25 INJECTION, SOLUTION INTRAMUSCULAR; INTRAVENOUS EVERY 5 MIN PRN
Status: DISCONTINUED | OUTPATIENT
Start: 2025-07-25 | End: 2025-07-25 | Stop reason: HOSPADM

## 2025-07-25 RX ORDER — ONDANSETRON 2 MG/ML
INJECTION INTRAMUSCULAR; INTRAVENOUS
Status: DISCONTINUED | OUTPATIENT
Start: 2025-07-25 | End: 2025-07-25 | Stop reason: SDUPTHER

## 2025-07-25 RX ORDER — MIDAZOLAM HYDROCHLORIDE 2 MG/2ML
2 INJECTION, SOLUTION INTRAMUSCULAR; INTRAVENOUS ONCE
Status: COMPLETED | OUTPATIENT
Start: 2025-07-25 | End: 2025-07-25

## 2025-07-25 RX ORDER — OXYCODONE HYDROCHLORIDE 5 MG/1
5 TABLET ORAL
Status: DISCONTINUED | OUTPATIENT
Start: 2025-07-25 | End: 2025-07-25 | Stop reason: HOSPADM

## 2025-07-25 RX ORDER — FENTANYL CITRATE 50 UG/ML
INJECTION, SOLUTION INTRAMUSCULAR; INTRAVENOUS
Status: DISCONTINUED | OUTPATIENT
Start: 2025-07-25 | End: 2025-07-25 | Stop reason: SDUPTHER

## 2025-07-25 RX ADMIN — BUPIVACAINE HYDROCHLORIDE 35 ML: 5 INJECTION, SOLUTION EPIDURAL; INTRACAUDAL; PERINEURAL at 07:16

## 2025-07-25 RX ADMIN — PROMETHAZINE HYDROCHLORIDE 12.5 MG: 12.5 TABLET ORAL at 07:06

## 2025-07-25 RX ADMIN — MIDAZOLAM HYDROCHLORIDE 2 MG: 1 INJECTION, SOLUTION INTRAMUSCULAR; INTRAVENOUS at 07:21

## 2025-07-25 RX ADMIN — SODIUM CHLORIDE, POTASSIUM CHLORIDE, SODIUM LACTATE AND CALCIUM CHLORIDE: 600; 310; 30; 20 INJECTION, SOLUTION INTRAVENOUS at 09:06

## 2025-07-25 RX ADMIN — KETOROLAC TROMETHAMINE 30 MG: 30 INJECTION, SOLUTION INTRAMUSCULAR at 10:47

## 2025-07-25 RX ADMIN — ROCURONIUM BROMIDE 50 MG: 10 INJECTION, SOLUTION INTRAVENOUS at 07:45

## 2025-07-25 RX ADMIN — APREPITANT 40 MG: 40 CAPSULE ORAL at 07:06

## 2025-07-25 RX ADMIN — PROPOFOL 200 MG: 10 INJECTION, EMULSION INTRAVENOUS at 07:45

## 2025-07-25 RX ADMIN — ONDANSETRON 4 MG: 2 INJECTION, SOLUTION INTRAMUSCULAR; INTRAVENOUS at 10:27

## 2025-07-25 RX ADMIN — Medication 2000 MG: at 08:00

## 2025-07-25 RX ADMIN — LIDOCAINE HYDROCHLORIDE 100 MG: 20 INJECTION, SOLUTION EPIDURAL; INFILTRATION; INTRACAUDAL; PERINEURAL at 07:45

## 2025-07-25 RX ADMIN — SODIUM CHLORIDE, POTASSIUM CHLORIDE, SODIUM LACTATE AND CALCIUM CHLORIDE: 600; 310; 30; 20 INJECTION, SOLUTION INTRAVENOUS at 06:43

## 2025-07-25 RX ADMIN — FENTANYL CITRATE 25 MCG: 50 INJECTION INTRAMUSCULAR; INTRAVENOUS at 09:48

## 2025-07-25 RX ADMIN — DEXAMETHASONE SODIUM PHOSPHATE 10 MG: 10 INJECTION INTRAMUSCULAR; INTRAVENOUS at 08:00

## 2025-07-25 RX ADMIN — SUGAMMADEX 200 MG: 100 INJECTION, SOLUTION INTRAVENOUS at 10:27

## 2025-07-25 RX ADMIN — FENTANYL CITRATE 25 MCG: 50 INJECTION INTRAMUSCULAR; INTRAVENOUS at 10:11

## 2025-07-25 ASSESSMENT — PAIN - FUNCTIONAL ASSESSMENT
PAIN_FUNCTIONAL_ASSESSMENT: 0-10
PAIN_FUNCTIONAL_ASSESSMENT: FACE, LEGS, ACTIVITY, CRY, AND CONSOLABILITY (FLACC)

## 2025-07-25 NOTE — BRIEF OP NOTE
Brief Postoperative Note      Patient: Shital Aaron  YOB: 1970  MRN: [id number]    Date of Procedure: 7/25/2025    Pre-Op Diagnosis Codes:  Adult acquired flatfoot, right  Posterior tibial tendinitis, right  Right foot and ankle pain    Post-Op Diagnosis: Same      Procedures:  Koutsogiannis osteotomy, right  Rich osteotomy, right  Secondary repair posterior tibial tendon, right  Flexor digitorum longus tendon transfer, right  Gastrocnemius recession, right    Surgeon(s):  Thania Zarate DPM    Assistant:  Resident: Damien Larose DPM; Anisa Pickett DPM    Anesthesia: General    Estimated Blood Loss (mL): less than 100     Complications: None    Specimens:   * No specimens in log *    Implants:  Implant Name Type Inv. Item Serial No.  Lot No. LRB No. Used Action   GRAFT BNE 2.5 CC CELLULAR BNE MTRX V92 FC+ - [id number]  GRAFT BNE 2.5 CC CELLULAR BNE MTRX V92 FC+ [id number] PARAGON 28-WD  Right 1 Implanted   SCREW MONSTER OLAF HEADED 7.0X55MM - [id number] Screw/Plate/Nail/Wally SCREW MONSTER OLAF HEADED 7.0X55MM  PARAGON 28-PMM  Right 2 Implanted   GRAFT BNE WDG ADDUCTION STRUCTURAL RICH 25DEG THICKNESS - [id number]  GRAFT BNE WDG ADDUCTION STRUCTURAL RICH 25DEG THICKNESS [id number] PARAGON 28-WD  Right 1 Implanted   ANCHOR SUT NDL DX FIBERTAK - [id number]  ANCHOR SUT NDL DX FIBERTAK  [employer]-WD [id number] Right 1 Implanted         Drains: * No LDAs found *    Findings:  Present At Time Of Surgery (PATOS) (choose all levels that have infection present):  No infection present  Other Findings: Patient with severe pes planovalgus deformity and posterior tibial tendinitis.  Deformity correction was achieved with Koutsoguiannis osteotomy and Rich osteotomy calcaneus.  Posterior tibial tendon was transected proximal to its insertion and debridement of pathologic tendon was performed.  The flexor digitorum longus tendon was then transected and utilized to reinforce the posterior  tibial tendon, tenodesis of the two tendons was then performed and the tendons were anchored to the navicular utilizing FiberTak anchor. The spring ligament was inspected and noted to be in tact.    Electronically signed by Damien Larose DPM on 7/25/2025 at 11:29 AM

## 2025-07-25 NOTE — DISCHARGE INSTRUCTIONS
Podiatric Post Operative Instructions:  You have had a surgical procedure on your right foot.      Fluids and Diet:  Begin with clear liquids, broth, dry toast, and crackers.  If not nauseated then resume your regular pre-operative diet when you are ready    Medications:  Take your prescriptions as directed  You are receiving new prescriptions for the pain medications Percocet and Toradol as well as the antinausea medication Zofran. Please take as directed.  You received popliteal block (nerve block in the back of knee)- this should manage your pain for the first 18hrs post operatively  If your pain is not severe then you may take the non-prescription medication that you normally take for aches and pains ie Tylenol and Ibuprofen (alternating), or if severe pain occurs these will serve as additional medication in conjuction with the Toradol or Percocet  You may resume your regularly scheduled medications (unless otherwise directed)  If any side effects or adverse reactions occur, discontinue the medication and contact your doctor.  Review the patient drug information that is provided before you take any medication    Ambulation and Activity:  You are advised to go directly home from the hospital  Use crutches, walker, or scooter as needed  We recommend knee scooter if possible, you can also obtain these on [employer] for rather affordable and quickly obtainable.  You may not put weight on the operated foot.  Do not walk on the right foot.  Avoid stairs.  Do not lift or move heavy objects  Do not drive until cleared by your physician    Bandage and Wound Care Instructions:  Keep bandage clean and dry  Do NOT remove dressing/ splint  DO NOT get wet  Please use shower cover around leg if you do shower so that dressing does not get wet  Do not shower or bathe the operative extremity  Do not remove the bandage (unless otherwise directed)   Do not attempt to put anything between the cast or dressing and your skin, some

## 2025-07-25 NOTE — ANESTHESIA POSTPROCEDURE EVALUATION
Department of Anesthesiology  Postprocedure Note    Patient: Shital Aaron  MRN: [id number]  YOB: 1970  Date of evaluation: 7/25/2025    Procedure Summary       Date: 07/25/25 Room / Location: [address]    Anesthesia Start: 0740 Anesthesia Stop: 1122    Procedures:       RIGHT KOUTSOGIANNIS OSTEOTOMY, SECONDARY REPAIR POSTERIOR TIBIAL TENDON,FLEXOR TENDON TRANSFER , C ARM, RIGHT GASTROENEMIS, MEDIAL DISPLACEMENT CALCANEAL OSTEOTOMY (Right)      ANKLE LIGAMENT TENDON REPAIR (Right) Diagnosis:       Posterior tibial tendinitis of right leg      Acquired right clubfoot      Tarsal coalitions      Right foot pain      (Posterior tibial tendinitis of right leg [M76.821])      (Acquired right clubfoot [M21.541])      (Tarsal coalitions [Q66.89])      (Right foot pain [M79.671])    Surgeons: Thania Zarate DPM Responsible Provider: Dianne Roberts MD    Anesthesia Type: general ASA Status: 3            Anesthesia Type: No value filed.    Kitty Phase I: Kitty Score: 10    Kitty Phase II: Kitty Score: 10    Anesthesia Post Evaluation    Patient location during evaluation: PACU  Patient participation: complete - patient participated  Level of consciousness: awake  Airway patency: patent  Nausea & Vomiting: no nausea  Cardiovascular status: blood pressure returned to baseline  Respiratory status: acceptable  Hydration status: euvolemic  Comments: Multimodal analgesia pain management as indicated by procedure  Multimodal analgesia pain management approach  Pain management: adequate    No notable events documented.

## 2025-07-25 NOTE — ANESTHESIA PROCEDURE NOTES
Peripheral Block    Patient location during procedure: pre-op  Reason for block: procedure for pain, post-op pain management and at surgeon's request  Start time: 7/25/2025 7:16 AM  End time: 7/25/2025 7:30 AM  Staffing  Performed: anesthesiologist   Anesthesiologist: Dianne Roberts MD  Performed by: Dianne Roberts MD  Authorized by: Dianne Roberts MD    Preanesthetic Checklist  Completed: patient identified, IV checked, site marked, risks and benefits discussed, surgical/procedural consents, equipment checked, pre-op evaluation, timeout performed, anesthesia consent given, oxygen available, monitors applied/VS acknowledged, fire risk safety assessment completed and verbalized and blood product R/B/A discussed and consented  Peripheral Block   Patient position: supine  Prep: ChloraPrep  Provider prep: mask and sterile gloves  Patient monitoring: cardiac monitor, continuous pulse ox, continuous capnometry, frequent blood pressure checks, IV access, oxygen and responsive to questions  Block type: Femoral and Sciatic  Laterality: right  Injection technique: single-shot  Guidance: ultrasound guided  Local infiltration: lidocaine (8mg decadron)  Infiltration strength: 1 %  Local infiltration: lidocaine (8mg decadron)  Dose: 3 mL    Needle   Needle type: pencil-tip   Needle gauge: 20 G  Needle localization: ultrasound guidance  Assessment   Injection assessment: negative aspiration for heme, no paresthesia on injection, local visualized surrounding nerve on ultrasound and no intravascular symptoms  Paresthesia pain: none  Slow fractionated injection: yes  Hemodynamics: stable  Outcomes: patient tolerated procedure well and uncomplicated    Additional Notes  Preprocedure ready time 0716 ,   Medications Administered  BUPivacaine (MARCAINE) PF injection 0.5% - Perineural   35 mL - 7/25/2025 7:16:00 AM

## 2025-07-25 NOTE — H&P
Interval H&P Note    Pt Name: Shital Aaron  MRN: [id number]  YOB: 1970  Date of evaluation: 7/25/2025      [x] I have reviewed in [facility] the cardiology pre-operative clearance note by Dr. Aguilar dated 7/8/25 for an Interval History and Physical note (attached below).      [x] I have examined  Shital Aaron, a 54 y.o. female.There are no changes to the patient who is scheduled for RIGHT KOUTSOGIANNIS OSTEOTOMY, SECONDARY REPAIR POSTERIOR TIBIAL TENDON, POSSIBLE FLEXOR TENDON TRANSFER, ANKLE LIGAMENT TENDON REPAIR by Thania Zarate DPM for Posterior tibial tendinitis of right leg; Acquired right clubfoot; Tarsal *. The patient denies new health changes, fever, chills, wheezing, cough, increased SOB, chest pain, open sores or wounds. Denies hx of diabetes. Denies any current blood thinning medications.      Received cardiac clearance for surgery (see media tab).     Vital signs: BP (!) 146/80   Pulse 88   Temp 97.3 °F (36.3 °C)   Resp 18   Ht 1.854 m (6' 1\")   Wt 112.5 kg (248 lb)   LMP 10/15/2013   SpO2 95%   BMI 32.72 kg/m²     Allergies:  Sulfa antibiotics, Bactrim [sulfamethoxazole-trimethoprim], and Dilaudid [hydromorphone hcl]    Medications:    Prior to Admission medications    Medication Sig Start Date End Date Taking? Authorizing Provider   PARoxetine (PAXIL) 20 MG tablet TAKE 1 TABLET DAILY 5/27/25   Myranda Diego APRN - CNP   pantoprazole (PROTONIX) 40 MG tablet Take 1 tablet by mouth every morning (before breakfast) 3/20/25   Myranda Diego APRN - CNP   vitamin D (ERGOCALCIFEROL) 1.25 MG (87867 UT) CAPS capsule TAKE 1 CAPSULE ONCE A WEEK  Patient taking differently: Take 1 capsule by mouth once a week Mondays 12/26/24   Yuki Anderson APRN - CNP   folic acid (FOLVITE) 1 MG tablet TAKE 1 TABLET DAILY 12/18/24   Myranda Diego APRN - CNP   ibuprofen (ADVIL;MOTRIN) 600 MG tablet Take 1 tablet by mouth every 6 hours 12/16/24   Narda Royal MD   ondansetron (ZOFRAN-ODT)

## 2025-07-26 NOTE — OP NOTE
Operative Note      Patient: Shital Aaron  YOB: 1970  MRN: [id number]    Date of Procedure: 7/25/2025    Pre-Op Diagnosis Codes:  Acquired pes planovalgus deformity, right  Tibialis Posterior tendinitis, right  Equinus deformity, right ankle  Right foot and ankle pain    Post-Op Diagnosis: Same    Procedures:  Koutsogiannis osteotomy, right  Rich osteotomy, right  Secondary repair posterior tibial tendon, right  Flexor digitorum longus tendon transfer, right  Gastrocnemius recession, right    Surgeon(s):  Thania Zarate DPM    Assistant:  Resident: Damien Larose DPM; Anisa Pickett DPM    Anesthesia: General    Estimated Blood Loss (mL): less than 100     Complications: None    Specimens:   * No specimens in log *    Implants:  Implant Name Type Inv. Item Serial No.  Lot No. LRB No. Used Action   GRAFT BNE 2.5 CC CELLULAR BNE MTRX V92 FC+ - [id number]  GRAFT BNE 2.5 CC CELLULAR BNE MTRX V92 FC+ [id number] PARAGON 28-WD  Right 1 Implanted   SCREW MONSTER OLAF HEADED 7.0X55MM - [id number] Screw/Plate/Nail/Wally SCREW MONSTER OLAF HEADED 7.0X55MM  PARAGON 28-PMM  Right 2 Implanted   GRAFT BNE WDG ADDUCTION STRUCTURAL RICH 25DEG THICKNESS - [id number]  GRAFT BNE WDG ADDUCTION STRUCTURAL RICH 25DEG THICKNESS [id number] PARAGON 28-WD  Right 1 Implanted   ANCHOR SUT NDL DX FIBERTAK - [id number]  ANCHOR SUT NDL DX FIBERTAK  [employer]-WD [id number] Right 1 Implanted         Drains: * No LDAs found *    Findings:  Present At Time Of Surgery (PATOS) (choose all levels that have infection present):  No infection present  Other Findings: Patient with severe pes planovalgus deformity and posterior tibial tendinitis.  Deformity correction was achieved with Koutsoguiannis osteotomy and Rich osteotomy calcaneus.  Posterior tibial tendon was transected proximal to its insertion and debridement of pathologic tendon was performed.  The flexor digitorum longus tendon was then transected and  posteriorly. The peroneal tendons were identified and retracted inferiorly, and the calcaneocuboid joint was visualized to ensure its preservation. An Rich osteotomy was performed through the anterior calcaneus, approximately 1.5 cm proximal to the calcaneocuboid joint. After trialing graft sizes, a 10 mm triangular allograft wedge was coated in V92 graft and was inserted to lengthen the lateral column, correcting forefoot abduction and restoring the medial longitudinal arch. No fixation was needed, the graft was noted to be stable within th osteotomy..Fluoroscopy was utilized to confirmeappropriate wedge placement and alignment correction.    Posterior Tibial Tendon Debridement and FDL Tendon Transfer:  A 6 cm medial incision was made along the course of the posterior tibial (PT) tendon, from posterior to the medial malleolus to the navicular. The saphenous vein and nerve were identified and retracted to prevent neurovascular injury. The PT tendon sheath was incised, revealing a significantly enlarged and discolored distal tendon with longitudinal tears, consistent with chronic tendonitis and stage IIb posterior tibial tendon dysfunction. The tendon was transected distal to the navicular. The diseased tendon segment was debrided. The flexor digitorum longus tendon was identified deep to the PT tendon,. At this time, the spring ligament was inspected and found to be intact, eliminating the need for additional repair. The FDL tendon was anchored to the navicular tuberosity using an [employer] FiberTak anchor and a side-to-side tenodesis was performed, attaching the PT tendon to the FDL tendon . The tenodesis was further reinforced with 2-0 FiberWire sutures to reinforce the repair and restore medial column stability.    All incisions were irrigated with Irricept solution. Deep tissues were closed with 2-0 Vicryl, subcutaneous tissues with 4-0 Vicryl, and skin with 4-0 Nylon. Sterile dressings and a well-padded

## (undated) DEVICE — SYRINGE MED 30ML STD CLR PLAS LUERLOCK TIP N CTRL DISP

## (undated) DEVICE — COUNTERSINK, 7.0 HEADED: Brand: MONSTER® SCREW SYSTEM

## (undated) DEVICE — YANKAUER,FLEXIBLE HANDLE,REGLR CAPACITY: Brand: MEDLINE INDUSTRIES, INC.

## (undated) DEVICE — SPLINT QUICK STEP SCOTCHCAST 5 X 30

## (undated) DEVICE — SUTURE VICRYL + SZ 0 L27IN ABSRB VLT L26MM UR-6 5/8 CIR VCP603H

## (undated) DEVICE — MHPB GYN ROBOTIC PACK: Brand: MEDLINE INDUSTRIES, INC.

## (undated) DEVICE — SUTURE VICRYL + SZ 4-0 L27IN ABSRB UD PS-2 3/8 CIR REV CUT VCP426H

## (undated) DEVICE — BONE FENESTRATION PERFORATOR: Brand: BABY GORILLA®/GORILLA® PLATING SYSTEM

## (undated) DEVICE — SOLUTION IV 500ML 0.9% SOD BOTTLE CHL LTWT DURABLE SHATTERPROOF

## (undated) DEVICE — BANDAGE COBAN 4 IN COMPR W4INXL5YD FOAM COHESIVE QUIK STK SELF ADH SFT

## (undated) DEVICE — 4-PORT MANIFOLD: Brand: NEPTUNE 2

## (undated) DEVICE — TUBING, SUCTION, 3/16" X 10', STRAIGHT: Brand: MEDLINE

## (undated) DEVICE — SUTURE VICRYL SZ 0 L36IN ABSRB UD L36MM CT-1 1/2 CIR J946H

## (undated) DEVICE — SINGLE-USE BIOPSY FORCEPS: Brand: RADIAL JAW 4

## (undated) DEVICE — SUTURE VICRYL + SZ 3-0 L27IN ABSRB UD L26MM SH 1/2 CIR VCP416H

## (undated) DEVICE — NEEDLE SPNL 18GA L3.5IN W/ QNCKE SHARPER BVL DURA CLICK

## (undated) DEVICE — INTENDED FOR TISSUE SEPARATION, AND OTHER PROCEDURES THAT REQUIRE A SHARP SURGICAL BLADE TO PUNCTURE OR CUT.: Brand: BARD-PARKER ® CARBON RIB-BACK BLADES

## (undated) DEVICE — SUTURE NONABSORBABLE MONOFILAMENT 3-0 PS-1 18 IN BLK ETHILON 1663H

## (undated) DEVICE — ARM DRAPE

## (undated) DEVICE — CATHETER URETH 18FR BLLN 30CC 2 W F INF CTRL BARDX

## (undated) DEVICE — Device

## (undated) DEVICE — SURGICEL ENDOSCP APPL

## (undated) DEVICE — GLOVE SURG SZ 7 CRM LTX FREE POLYISOPRENE POLYMER BEAD ANTI

## (undated) DEVICE — BLADE,CARBON-STEEL,15,STRL,DISPOSABLE,TB: Brand: MEDLINE

## (undated) DEVICE — UNDERPANTS MAT L XL SEAMLESS CLR CODE WAISTBAND KNIT

## (undated) DEVICE — PAD,SANITARY,11 IN,MAXI,W/WINGS,N-STRL: Brand: MEDLINE

## (undated) DEVICE — DRESSING,GAUZE,OIL EMLSN,CURAD,3X8",1/PK: Brand: CURAD

## (undated) DEVICE — DRAINBAG,ANTI-REFLUX TOWER,L/F,2000ML,LL: Brand: MEDLINE

## (undated) DEVICE — SUTURE MONOCRYL + SZ 4-0 L27IN ABSRB UD L19MM PS-2 3/8 CIR MCP426H

## (undated) DEVICE — JELLY,LUBE,STERILE,FLIP TOP,TUBE,2-OZ: Brand: MEDLINE

## (undated) DEVICE — PAD PT POS 36 IN SURGYPAD DISP

## (undated) DEVICE — SIMPLICITY FLUFF UNDERPAD 23X36, MODERATE: Brand: SIMPLICITY

## (undated) DEVICE — SEAL

## (undated) DEVICE — K-WIRE, SINGLE ENDED TROCAR TIP, SMOOTH, 2.0 X 150MM
Type: IMPLANTABLE DEVICE | Site: ANKLE | Status: NON-FUNCTIONAL
Brand: MULTI SYSTEM
Removed: 2025-07-25

## (undated) DEVICE — BNDG,ELSTC,MATRIX,STRL,6"X5YD,LF,HOOK&LP: Brand: MEDLINE

## (undated) DEVICE — CANNULA IV 18GA L15IN BLNT FILL LUERLOCK HUB MJCT

## (undated) DEVICE — DRESSING HYDROCOLLOID BORDER 35X10 IN ALUM PRIMASEAL

## (undated) DEVICE — PADDING CAST W6INXL4YD ST COT RAYON MICROPLEATED HIGHLY

## (undated) DEVICE — KIT INSTR DRL GUID 1.6/1.35MM GUIDWIRE DISP FIBERTAK DX

## (undated) DEVICE — DEVICE TRCR 12X9X3IN WHT CLSR DISP OMNICLOSE

## (undated) DEVICE — SOLUTION IV 1000ML 0.9% SOD CHL PH 5 INJ USP VIAFLX PLAS

## (undated) DEVICE — TROCAR: Brand: KII FIOS FIRST ENTRY

## (undated) DEVICE — SKIN PREP TRAY W/CHG: Brand: MEDLINE INDUSTRIES, INC.

## (undated) DEVICE — SUTURE NRLN SZ 4-0 L18IN NONABSORBABLE BLK L17MM RB-1 1/2 C554D

## (undated) DEVICE — GLOVE ORANGE PI 7 1/2   MSG9075

## (undated) DEVICE — ELECTRODE PT RET AD L9FT HI MOIST COND ADH HYDRGEL CORDED

## (undated) DEVICE — ADAPTER,CATHETER/SYRINGE/LUER,STERILE: Brand: MEDLINE

## (undated) DEVICE — TIP COVER ACCESSORY

## (undated) DEVICE — BITEBLOCK 54FR W/ DENT RIM BLOX

## (undated) DEVICE — BLADELESS OBTURATOR: Brand: WECK VISTA

## (undated) DEVICE — BASIN EMSIS 700ML GRAPHITE PLAS KID SHP GRAD

## (undated) DEVICE — AGENT HEMSTAT 3GM OXIDIZED REGENERATED CELOS ABSRB FOR CONT (ORDER MULTIPLES OF 5EA)

## (undated) DEVICE — PRECISION THIN (9.0 X 0.38 X 25.0MM)

## (undated) DEVICE — BANDAGE COMPR W4INXL5YD WHT BGE POLY COT M E WRP WV HK AND

## (undated) DEVICE — GOWN,SIRUS,NONRNF,SETINSLV,XL,20/CS: Brand: MEDLINE

## (undated) DEVICE — Device: Brand: DEFENDO VALVE AND CONNECTOR KIT

## (undated) DEVICE — INSUFFLATION NEEDLE TO ESTABLISH PNEUMOPERITONEUM.: Brand: INSUFFLATION NEEDLE

## (undated) DEVICE — DRILL, 4.6 X 220MM, CANNULATED, 3/16" SQ. CONNECTION: Brand: MONSTER® SCREW SYSTEM

## (undated) DEVICE — PRECISION THIN (9.0 X 0.38 X 31.0MM)

## (undated) DEVICE — SYRINGE IRRIG 60ML SFT PLIABLE BLB EZ TO GRP 1 HND USE W/

## (undated) DEVICE — SOLUTION IRRIG 1000ML 0.9% SOD CHL USP POUR PLAS BTL

## (undated) DEVICE — K-WIRE, SINGLE ENDED TROCAR TIP, SMOOTH, 2.3 X 230MM
Type: IMPLANTABLE DEVICE | Site: ANKLE | Status: NON-FUNCTIONAL
Brand: MONSTER SCREW SYSTEM
Removed: 2025-07-25

## (undated) DEVICE — SUTURE N ABSRB L 18 IN SZ 4-0 NDL L 19 MM NYL MONOFILAMENT

## (undated) DEVICE — CONNECTOR TBNG AUX H2O JET DISP FOR OLY 160/180 SER

## (undated) DEVICE — ANCHOR TISSUE RETRIEVAL SYSTEM, BAG SIZE 125 ML, PORT SIZE 8 MM: Brand: ANCHOR TISSUE RETRIEVAL SYSTEM

## (undated) DEVICE — VCARE SMALL, UTERINE MANIPULATOR, VAGINAL-CERVICAL-AHLUWALIA'S-RETRACTOR-ELEVATOR: Brand: VCARE

## (undated) DEVICE — GLOVE SURG SZ 65 CRM LTX FREE POLYISOPRENE POLYMER BEAD ANTI

## (undated) DEVICE — STAZ LOWER EXTREMITY: Brand: MEDLINE INDUSTRIES, INC.

## (undated) DEVICE — SUTURE PDS II SZ 0 L36IN ABSRB VLT L36MM CT-1 1/2 CIR Z346H

## (undated) DEVICE — SUTURE VICRYL + ABSORBABLE BRAIDED 2-0 FS1 27 IN WHT  VCP443H

## (undated) DEVICE — GAUZE,SPONGE,FLUFF,6"X6.75",STRL,5/TRAY: Brand: MEDLINE

## (undated) DEVICE — MASTISOL ADHESIVE LIQ 2/3ML

## (undated) DEVICE — BNDG,ELSTC,MATRIX,STRL,4"X5YD,LF,HOOK&LP: Brand: MEDLINE

## (undated) DEVICE — DRAPE,REIN 53X77,STERILE: Brand: MEDLINE

## (undated) DEVICE — STRIP SKIN CLSR W0.25XL4IN WHT SPUNBOUND FBR NYL HI ADH

## (undated) DEVICE — NEEDLE,22GX1.5",REG,BEVEL: Brand: MEDLINE